# Patient Record
Sex: FEMALE | Race: WHITE | Employment: OTHER | URBAN - METROPOLITAN AREA
[De-identification: names, ages, dates, MRNs, and addresses within clinical notes are randomized per-mention and may not be internally consistent; named-entity substitution may affect disease eponyms.]

---

## 2017-01-01 ENCOUNTER — TELEPHONE (OUTPATIENT)
Dept: FAMILY MEDICINE CLINIC | Facility: CLINIC | Age: 72
End: 2017-01-01

## 2017-01-01 ENCOUNTER — OFFICE VISIT (OUTPATIENT)
Dept: FAMILY MEDICINE CLINIC | Facility: CLINIC | Age: 72
End: 2017-01-01

## 2017-01-01 ENCOUNTER — TELEPHONE (OUTPATIENT)
Dept: NEUROLOGY | Facility: CLINIC | Age: 72
End: 2017-01-01

## 2017-01-01 ENCOUNTER — SURGERY (OUTPATIENT)
Age: 72
End: 2017-01-01

## 2017-01-01 ENCOUNTER — APPOINTMENT (OUTPATIENT)
Dept: PHYSICAL THERAPY | Age: 72
End: 2017-01-01
Attending: FAMILY MEDICINE
Payer: MEDICARE

## 2017-01-01 ENCOUNTER — OFFICE VISIT (OUTPATIENT)
Dept: NEUROLOGY | Facility: CLINIC | Age: 72
End: 2017-01-01

## 2017-01-01 ENCOUNTER — ANESTHESIA EVENT (OUTPATIENT)
Dept: CARDIAC SURGERY | Facility: HOSPITAL | Age: 72
DRG: 252 | End: 2017-01-01
Payer: MEDICARE

## 2017-01-01 ENCOUNTER — HOSPITAL ENCOUNTER (OUTPATIENT)
Dept: GENERAL RADIOLOGY | Age: 72
Discharge: HOME | End: 2017-01-01
Attending: PHYSICAL MEDICINE & REHABILITATION
Payer: MEDICARE

## 2017-01-01 ENCOUNTER — ANESTHESIA (OUTPATIENT)
Dept: CARDIAC SURGERY | Facility: HOSPITAL | Age: 72
DRG: 252 | End: 2017-01-01
Payer: MEDICARE

## 2017-01-01 ENCOUNTER — LAB ENCOUNTER (OUTPATIENT)
Dept: LAB | Age: 72
End: 2017-01-01
Attending: FAMILY MEDICINE
Payer: MEDICARE

## 2017-01-01 ENCOUNTER — PATIENT OUTREACH (OUTPATIENT)
Dept: CASE MANAGEMENT | Age: 72
End: 2017-01-01

## 2017-01-01 ENCOUNTER — HOSPITAL ENCOUNTER (INPATIENT)
Facility: HOSPITAL | Age: 72
LOS: 3 days | Discharge: HOME | DRG: 252 | End: 2017-01-01
Attending: SURGERY | Admitting: SURGERY
Payer: MEDICARE

## 2017-01-01 VITALS
HEIGHT: 62 IN | HEART RATE: 102 BPM | TEMPERATURE: 98 F | RESPIRATION RATE: 17 BRPM | WEIGHT: 276.44 LBS | DIASTOLIC BLOOD PRESSURE: 46 MMHG | OXYGEN SATURATION: 100 % | BODY MASS INDEX: 50.87 KG/M2 | SYSTOLIC BLOOD PRESSURE: 92 MMHG

## 2017-01-01 VITALS
HEART RATE: 101 BPM | TEMPERATURE: 99 F | DIASTOLIC BLOOD PRESSURE: 62 MMHG | WEIGHT: 276.31 LBS | SYSTOLIC BLOOD PRESSURE: 113 MMHG | BODY MASS INDEX: 51 KG/M2

## 2017-01-01 VITALS
HEART RATE: 76 BPM | SYSTOLIC BLOOD PRESSURE: 122 MMHG | DIASTOLIC BLOOD PRESSURE: 50 MMHG | WEIGHT: 265 LBS | BODY MASS INDEX: 48.76 KG/M2 | HEIGHT: 62 IN | RESPIRATION RATE: 16 BRPM

## 2017-01-01 VITALS
SYSTOLIC BLOOD PRESSURE: 85 MMHG | HEART RATE: 89 BPM | DIASTOLIC BLOOD PRESSURE: 54 MMHG | HEIGHT: 62 IN | BODY MASS INDEX: 49.94 KG/M2 | WEIGHT: 271.38 LBS

## 2017-01-01 VITALS
DIASTOLIC BLOOD PRESSURE: 56 MMHG | BODY MASS INDEX: 50.11 KG/M2 | HEART RATE: 79 BPM | WEIGHT: 265.44 LBS | SYSTOLIC BLOOD PRESSURE: 107 MMHG | HEIGHT: 61 IN

## 2017-01-01 DIAGNOSIS — N18.6 ESRD (END STAGE RENAL DISEASE) (HCC): Primary | ICD-10-CM

## 2017-01-01 DIAGNOSIS — M54.16 LUMBAR RADICULOPATHY: ICD-10-CM

## 2017-01-01 DIAGNOSIS — R60.0 BILATERAL EDEMA OF LOWER EXTREMITY: Primary | ICD-10-CM

## 2017-01-01 DIAGNOSIS — Z02.9 ENCOUNTERS FOR ADMINISTRATIVE PURPOSE: ICD-10-CM

## 2017-01-01 DIAGNOSIS — I48.21 PERMANENT ATRIAL FIBRILLATION (HCC): ICD-10-CM

## 2017-01-01 DIAGNOSIS — I89.0 LYMPHEDEMA OF BOTH LOWER EXTREMITIES: Primary | ICD-10-CM

## 2017-01-01 DIAGNOSIS — M1A.9XX0 CHRONIC GOUT WITHOUT TOPHUS, UNSPECIFIED CAUSE, UNSPECIFIED SITE: ICD-10-CM

## 2017-01-01 DIAGNOSIS — E03.9 ACQUIRED HYPOTHYROIDISM: ICD-10-CM

## 2017-01-01 DIAGNOSIS — G62.9 PERIPHERAL POLYNEUROPATHY: ICD-10-CM

## 2017-01-01 DIAGNOSIS — K27.9 PUD (PEPTIC ULCER DISEASE): ICD-10-CM

## 2017-01-01 DIAGNOSIS — M48.061 SPINAL STENOSIS OF LUMBAR REGION WITHOUT NEUROGENIC CLAUDICATION: ICD-10-CM

## 2017-01-01 DIAGNOSIS — I95.9 HYPOTENSION, UNSPECIFIED HYPOTENSION TYPE: ICD-10-CM

## 2017-01-01 DIAGNOSIS — Z78.0 POSTMENOPAUSAL: ICD-10-CM

## 2017-01-01 DIAGNOSIS — I27.20 PULMONARY HYPERTENSION (HCC): ICD-10-CM

## 2017-01-01 DIAGNOSIS — M1A.00X0 IDIOPATHIC CHRONIC GOUT WITHOUT TOPHUS, UNSPECIFIED SITE: ICD-10-CM

## 2017-01-01 DIAGNOSIS — E78.5 HYPERLIPIDEMIA, UNSPECIFIED HYPERLIPIDEMIA TYPE: ICD-10-CM

## 2017-01-01 DIAGNOSIS — Z86.39 HISTORY OF DIABETES MELLITUS, TYPE II: ICD-10-CM

## 2017-01-01 DIAGNOSIS — G57.93 NEUROPATHY INVOLVING BOTH LOWER EXTREMITIES: ICD-10-CM

## 2017-01-01 DIAGNOSIS — E83.51 HYPOCALCEMIA: ICD-10-CM

## 2017-01-01 DIAGNOSIS — M47.816 LUMBAR FACET ARTHROPATHY: ICD-10-CM

## 2017-01-01 DIAGNOSIS — Z00.00 MEDICARE ANNUAL WELLNESS VISIT, SUBSEQUENT: Primary | ICD-10-CM

## 2017-01-01 DIAGNOSIS — E66.01 MORBID OBESITY WITH BMI OF 50.0-59.9, ADULT (HCC): ICD-10-CM

## 2017-01-01 DIAGNOSIS — Z12.39 SCREENING FOR MALIGNANT NEOPLASM OF BREAST: ICD-10-CM

## 2017-01-01 LAB
ALBUMIN SERPL-MCNC: 3.2 G/DL (ref 3.5–4.8)
ALP LIVER SERPL-CCNC: 158 U/L (ref 55–142)
ALT SERPL-CCNC: 22 U/L (ref 14–54)
APTT PPP: 33.7 SECONDS (ref 25–34)
AST SERPL-CCNC: 22 U/L (ref 15–41)
ATRIAL RATE: 468 BPM
BASOPHILS # BLD AUTO: 0.05 X10(3) UL (ref 0–0.1)
BASOPHILS NFR BLD AUTO: 0.7 %
BILIRUB SERPL-MCNC: 0.7 MG/DL (ref 0.1–2)
BUN BLD-MCNC: 20 MG/DL (ref 8–20)
CALCIUM BLD-MCNC: 9.5 MG/DL (ref 8.3–10.3)
CHLORIDE: 97 MMOL/L (ref 101–111)
CHOLEST SMN-MCNC: 107 MG/DL (ref ?–200)
CO2: 28 MMOL/L (ref 22–32)
CREAT BLD-MCNC: 3.84 MG/DL (ref 0.55–1.02)
EOSINOPHIL # BLD AUTO: 0.16 X10(3) UL (ref 0–0.3)
EOSINOPHIL NFR BLD AUTO: 2.3 %
ERYTHROCYTE [DISTWIDTH] IN BLOOD BY AUTOMATED COUNT: 15 % (ref 11.5–16)
EST. AVERAGE GLUCOSE BLD GHB EST-MCNC: 97 MG/DL (ref 68–126)
FREE T4: 1.2 NG/DL (ref 0.9–1.8)
GLUCOSE BLD-MCNC: 101 MG/DL (ref 65–99)
GLUCOSE BLD-MCNC: 104 MG/DL (ref 65–99)
GLUCOSE BLD-MCNC: 108 MG/DL (ref 65–99)
GLUCOSE BLD-MCNC: 109 MG/DL (ref 70–99)
GLUCOSE BLD-MCNC: 112 MG/DL (ref 65–99)
GLUCOSE BLD-MCNC: 113 MG/DL (ref 65–99)
GLUCOSE BLD-MCNC: 118 MG/DL (ref 65–99)
GLUCOSE BLD-MCNC: 121 MG/DL (ref 65–99)
GLUCOSE BLD-MCNC: 129 MG/DL (ref 65–99)
GLUCOSE BLD-MCNC: 86 MG/DL (ref 65–99)
GLUCOSE BLD-MCNC: 91 MG/DL (ref 65–99)
HBA1C MFR BLD HPLC: 5 % (ref ?–5.7)
HCT VFR BLD AUTO: 38.3 % (ref 34–50)
HDLC SERPL-MCNC: 44 MG/DL (ref 45–?)
HDLC SERPL: 2.43 {RATIO} (ref ?–4.44)
HGB BLD-MCNC: 12 G/DL (ref 12–16)
IMMATURE GRANULOCYTE COUNT: 0.02 X10(3) UL (ref 0–1)
IMMATURE GRANULOCYTE RATIO %: 0.3 %
INR BLD: 1.12 (ref 0.89–1.11)
INR BLD: 1.24 (ref 0.89–1.11)
LDLC SERPL CALC-MCNC: 33 MG/DL (ref ?–130)
LDLC SERPL-MCNC: 30 MG/DL (ref 5–40)
LYMPHOCYTES # BLD AUTO: 0.59 X10(3) UL (ref 0.9–4)
LYMPHOCYTES NFR BLD AUTO: 8.4 %
M PROTEIN MFR SERPL ELPH: 9.4 G/DL (ref 6.1–8.3)
MCH RBC QN AUTO: 35.5 PG (ref 27–33.2)
MCHC RBC AUTO-ENTMCNC: 31.3 G/DL (ref 31–37)
MCV RBC AUTO: 113.3 FL (ref 81–100)
MONOCYTES # BLD AUTO: 0.69 X10(3) UL (ref 0.1–0.6)
MONOCYTES NFR BLD AUTO: 9.9 %
NEUTROPHIL ABS PRELIM: 5.49 X10 (3) UL (ref 1.3–6.7)
NEUTROPHILS # BLD AUTO: 5.49 X10(3) UL (ref 1.3–6.7)
NEUTROPHILS NFR BLD AUTO: 78.4 %
NONHDLC SERPL-MCNC: 63 MG/DL (ref ?–130)
PLATELET # BLD AUTO: 122 10(3)UL (ref 150–450)
POTASSIUM SERPL-SCNC: 4 MMOL/L (ref 3.6–5.1)
PSA SERPL DL<=0.01 NG/ML-MCNC: 14.5 SECONDS (ref 12–14.3)
PSA SERPL DL<=0.01 NG/ML-MCNC: 15.7 SECONDS (ref 12–14.3)
Q-T INTERVAL: 396 MS
QRS DURATION: 148 MS
QTC CALCULATION (BEZET): 545 MS
R AXIS: -82 DEGREES
RBC # BLD AUTO: 3.38 X10(6)UL (ref 3.8–5.1)
RED CELL DISTRIBUTION WIDTH-SD: 62.2 FL (ref 35.1–46.3)
SODIUM SERPL-SCNC: 135 MMOL/L (ref 136–144)
T AXIS: 19 DEGREES
TRIGLYCERIDES: 149 MG/DL (ref ?–150)
TSI SER-ACNC: 4.94 MIU/ML (ref 0.35–5.5)
URIC ACID: 2.9 MG/DL (ref 2.4–8)
VENTRICULAR RATE: 114 BPM
WBC # BLD AUTO: 7 X10(3) UL (ref 4–13)

## 2017-01-01 PROCEDURE — 99232 SBSQ HOSP IP/OBS MODERATE 35: CPT | Performed by: INTERNAL MEDICINE

## 2017-01-01 PROCEDURE — 36415 COLL VENOUS BLD VENIPUNCTURE: CPT

## 2017-01-01 PROCEDURE — 84550 ASSAY OF BLOOD/URIC ACID: CPT

## 2017-01-01 PROCEDURE — G0439 PPPS, SUBSEQ VISIT: HCPCS | Performed by: FAMILY MEDICINE

## 2017-01-01 PROCEDURE — 84443 ASSAY THYROID STIM HORMONE: CPT

## 2017-01-01 PROCEDURE — 99204 OFFICE O/P NEW MOD 45 MIN: CPT | Performed by: PHYSICAL MEDICINE & REHABILITATION

## 2017-01-01 PROCEDURE — 83036 HEMOGLOBIN GLYCOSYLATED A1C: CPT

## 2017-01-01 PROCEDURE — 99496 TRANSJ CARE MGMT HIGH F2F 7D: CPT | Performed by: FAMILY MEDICINE

## 2017-01-01 PROCEDURE — 99233 SBSQ HOSP IP/OBS HIGH 50: CPT | Performed by: HOSPITALIST

## 2017-01-01 PROCEDURE — 90935 HEMODIALYSIS ONE EVALUATION: CPT | Performed by: INTERNAL MEDICINE

## 2017-01-01 PROCEDURE — 99232 SBSQ HOSP IP/OBS MODERATE 35: CPT | Performed by: HOSPITALIST

## 2017-01-01 PROCEDURE — 03180ZD BYPASS LEFT BRACHIAL ARTERY TO UPPER ARM VEIN, OPEN APPROACH: ICD-10-PCS | Performed by: SURGERY

## 2017-01-01 PROCEDURE — 80061 LIPID PANEL: CPT

## 2017-01-01 PROCEDURE — 5A1D00Z PERFORMANCE OF URINARY FILTRATION, SINGLE: ICD-10-PCS | Performed by: INTERNAL MEDICINE

## 2017-01-01 PROCEDURE — 99231 SBSQ HOSP IP/OBS SF/LOW 25: CPT | Performed by: HOSPITALIST

## 2017-01-01 PROCEDURE — 72110 X-RAY EXAM L-2 SPINE 4/>VWS: CPT | Performed by: PHYSICAL MEDICINE & REHABILITATION

## 2017-01-01 PROCEDURE — 84439 ASSAY OF FREE THYROXINE: CPT

## 2017-01-01 PROCEDURE — 99222 1ST HOSP IP/OBS MODERATE 55: CPT | Performed by: INTERNAL MEDICINE

## 2017-01-01 PROCEDURE — 99214 OFFICE O/P EST MOD 30 MIN: CPT | Performed by: FAMILY MEDICINE

## 2017-01-01 PROCEDURE — 05SC0ZZ REPOSITION LEFT BASILIC VEIN, OPEN APPROACH: ICD-10-PCS | Performed by: SURGERY

## 2017-01-01 RX ORDER — LEVOTHYROXINE SODIUM 0.12 MG/1
TABLET ORAL
Qty: 90 TABLET | Refills: 0 | Status: SHIPPED | OUTPATIENT
Start: 2017-01-01 | End: 2017-01-01

## 2017-01-01 RX ORDER — LEVOTHYROXINE SODIUM 0.12 MG/1
125 TABLET ORAL
Status: DISCONTINUED | OUTPATIENT
Start: 2017-01-01 | End: 2017-01-01

## 2017-01-01 RX ORDER — ALLOPURINOL 100 MG/1
100 TABLET ORAL 2 TIMES DAILY
Status: DISCONTINUED | OUTPATIENT
Start: 2017-01-01 | End: 2017-01-01

## 2017-01-01 RX ORDER — BUPIVACAINE HYDROCHLORIDE 5 MG/ML
INJECTION, SOLUTION PERINEURAL AS NEEDED
Status: DISCONTINUED | OUTPATIENT
Start: 2017-01-01 | End: 2017-01-01 | Stop reason: HOSPADM

## 2017-01-01 RX ORDER — HEPARIN SODIUM 1000 [USP'U]/ML
1600 INJECTION, SOLUTION INTRAVENOUS; SUBCUTANEOUS ONCE
Status: COMPLETED | OUTPATIENT
Start: 2017-01-01 | End: 2017-01-01

## 2017-01-01 RX ORDER — MORPHINE SULFATE 4 MG/ML
4 INJECTION, SOLUTION INTRAMUSCULAR; INTRAVENOUS
Status: DISCONTINUED | OUTPATIENT
Start: 2017-01-01 | End: 2017-01-01

## 2017-01-01 RX ORDER — HEPARIN SODIUM 1000 [USP'U]/ML
1.5 INJECTION, SOLUTION INTRAVENOUS; SUBCUTANEOUS ONCE
Status: COMPLETED | OUTPATIENT
Start: 2017-01-01 | End: 2017-01-01

## 2017-01-01 RX ORDER — DEXTROSE MONOHYDRATE 25 G/50ML
50 INJECTION, SOLUTION INTRAVENOUS
Status: DISCONTINUED | OUTPATIENT
Start: 2017-01-01 | End: 2017-01-01 | Stop reason: HOSPADM

## 2017-01-01 RX ORDER — ONDANSETRON 2 MG/ML
4 INJECTION INTRAMUSCULAR; INTRAVENOUS AS NEEDED
Status: DISCONTINUED | OUTPATIENT
Start: 2017-01-01 | End: 2017-01-01 | Stop reason: HOSPADM

## 2017-01-01 RX ORDER — DOCUSATE SODIUM 100 MG/1
100 CAPSULE, LIQUID FILLED ORAL 2 TIMES DAILY
Status: DISCONTINUED | OUTPATIENT
Start: 2017-01-01 | End: 2017-01-01

## 2017-01-01 RX ORDER — ONDANSETRON 4 MG/1
4 TABLET, ORALLY DISINTEGRATING ORAL EVERY 6 HOURS PRN
Status: DISCONTINUED | OUTPATIENT
Start: 2017-01-01 | End: 2017-01-01

## 2017-01-01 RX ORDER — CEFAZOLIN SODIUM 1 G/3ML
INJECTION, POWDER, FOR SOLUTION INTRAMUSCULAR; INTRAVENOUS
Status: DISCONTINUED | OUTPATIENT
Start: 2017-01-01 | End: 2017-01-01

## 2017-01-01 RX ORDER — HYDROCODONE BITARTRATE AND ACETAMINOPHEN 5; 325 MG/1; MG/1
1 TABLET ORAL EVERY 4 HOURS PRN
Qty: 60 TABLET | Refills: 0 | Status: SHIPPED | OUTPATIENT
Start: 2017-01-01 | End: 2017-01-01

## 2017-01-01 RX ORDER — SODIUM CHLORIDE 9 MG/ML
INJECTION, SOLUTION INTRAVENOUS CONTINUOUS
Status: DISCONTINUED | OUTPATIENT
Start: 2017-01-01 | End: 2017-01-01

## 2017-01-01 RX ORDER — WARFARIN SODIUM 4 MG/1
6 TABLET ORAL DAILY
Qty: 30 TABLET | Refills: 0 | Status: SHIPPED | OUTPATIENT
Start: 2017-01-01 | End: 2018-01-01

## 2017-01-01 RX ORDER — MIDODRINE HYDROCHLORIDE 10 MG/1
10 TABLET ORAL 2 TIMES DAILY PRN
Status: DISCONTINUED | OUTPATIENT
Start: 2017-01-01 | End: 2017-01-01

## 2017-01-01 RX ORDER — OMEPRAZOLE 20 MG/1
CAPSULE, DELAYED RELEASE ORAL
Qty: 90 CAPSULE | Refills: 0 | Status: SHIPPED | OUTPATIENT
Start: 2017-01-01 | End: 2018-01-01

## 2017-01-01 RX ORDER — HEPARIN SODIUM 1000 [USP'U]/ML
1.5 INJECTION, SOLUTION INTRAVENOUS; SUBCUTANEOUS ONCE
Status: DISCONTINUED | OUTPATIENT
Start: 2017-01-01 | End: 2017-01-01

## 2017-01-01 RX ORDER — PANTOPRAZOLE SODIUM 20 MG/1
20 TABLET, DELAYED RELEASE ORAL
Status: DISCONTINUED | OUTPATIENT
Start: 2017-01-01 | End: 2017-01-01

## 2017-01-01 RX ORDER — MORPHINE SULFATE 4 MG/ML
8 INJECTION, SOLUTION INTRAMUSCULAR; INTRAVENOUS
Status: DISCONTINUED | OUTPATIENT
Start: 2017-01-01 | End: 2017-01-01

## 2017-01-01 RX ORDER — MIDODRINE HYDROCHLORIDE 10 MG/1
10 TABLET ORAL ONCE
Status: COMPLETED | OUTPATIENT
Start: 2017-01-01 | End: 2017-01-01

## 2017-01-01 RX ORDER — LEVOTHYROXINE SODIUM 0.12 MG/1
TABLET ORAL
Qty: 90 TABLET | Refills: 0 | Status: SHIPPED | OUTPATIENT
Start: 2017-01-01 | End: 2018-01-01

## 2017-01-01 RX ORDER — HYDROCODONE BITARTRATE AND ACETAMINOPHEN 5; 325 MG/1; MG/1
2 TABLET ORAL AS NEEDED
Status: DISCONTINUED | OUTPATIENT
Start: 2017-01-01 | End: 2017-01-01 | Stop reason: HOSPADM

## 2017-01-01 RX ORDER — PSEUDOEPHEDRINE HCL 30 MG
100 TABLET ORAL 2 TIMES DAILY
Qty: 30 CAPSULE | Refills: 0 | Status: SHIPPED | OUTPATIENT
Start: 2017-01-01 | End: 2017-01-01

## 2017-01-01 RX ORDER — HYDROCODONE BITARTRATE AND ACETAMINOPHEN 5; 325 MG/1; MG/1
2 TABLET ORAL EVERY 4 HOURS PRN
Status: DISCONTINUED | OUTPATIENT
Start: 2017-01-01 | End: 2017-01-01

## 2017-01-01 RX ORDER — DILTIAZEM HYDROCHLORIDE 5 MG/ML
5 INJECTION INTRAVENOUS ONCE
Status: COMPLETED | OUTPATIENT
Start: 2017-01-01 | End: 2017-01-01

## 2017-01-01 RX ORDER — HYDROCODONE BITARTRATE AND ACETAMINOPHEN 5; 325 MG/1; MG/1
1 TABLET ORAL AS NEEDED
Status: DISCONTINUED | OUTPATIENT
Start: 2017-01-01 | End: 2017-01-01 | Stop reason: HOSPADM

## 2017-01-01 RX ORDER — SODIUM CHLORIDE, SODIUM LACTATE, POTASSIUM CHLORIDE, CALCIUM CHLORIDE 600; 310; 30; 20 MG/100ML; MG/100ML; MG/100ML; MG/100ML
INJECTION, SOLUTION INTRAVENOUS CONTINUOUS
Status: DISCONTINUED | OUTPATIENT
Start: 2017-01-01 | End: 2017-01-01

## 2017-01-01 RX ORDER — DILTIAZEM HYDROCHLORIDE 5 MG/ML
INJECTION INTRAVENOUS
Status: COMPLETED
Start: 2017-01-01 | End: 2017-01-01

## 2017-01-01 RX ORDER — GABAPENTIN 300 MG/1
CAPSULE ORAL
Qty: 60 CAPSULE | Refills: 1 | Status: SHIPPED | OUTPATIENT
Start: 2017-01-01 | End: 2018-01-01

## 2017-01-01 RX ORDER — HYDROCODONE BITARTRATE AND ACETAMINOPHEN 5; 325 MG/1; MG/1
1 TABLET ORAL EVERY 4 HOURS PRN
Status: DISCONTINUED | OUTPATIENT
Start: 2017-01-01 | End: 2017-01-01

## 2017-01-01 RX ORDER — OMEPRAZOLE 20 MG/1
CAPSULE, DELAYED RELEASE ORAL
Qty: 90 CAPSULE | Refills: 0 | Status: SHIPPED | OUTPATIENT
Start: 2017-01-01 | End: 2017-01-01

## 2017-01-01 RX ORDER — ALLOPURINOL 100 MG/1
TABLET ORAL
Qty: 180 TABLET | Refills: 0 | Status: SHIPPED | OUTPATIENT
Start: 2017-01-01 | End: 2018-01-01

## 2017-01-01 RX ORDER — ALBUMIN (HUMAN) 12.5 G/50ML
25 SOLUTION INTRAVENOUS
Status: DISCONTINUED | OUTPATIENT
Start: 2017-01-01 | End: 2017-01-01

## 2017-01-01 RX ORDER — NALOXONE HYDROCHLORIDE 0.4 MG/ML
80 INJECTION, SOLUTION INTRAMUSCULAR; INTRAVENOUS; SUBCUTANEOUS AS NEEDED
Status: DISCONTINUED | OUTPATIENT
Start: 2017-01-01 | End: 2017-01-01 | Stop reason: HOSPADM

## 2017-01-01 RX ORDER — MAGNESIUM OXIDE TAB 400 MG (241.3 MG ELEMENTAL MG) 400 (241.3 MG) MG
1 TAB ORAL NIGHTLY
Refills: 3 | COMMUNITY
Start: 2017-06-29

## 2017-01-01 RX ORDER — MIDODRINE HYDROCHLORIDE 10 MG/1
10 TABLET ORAL 2 TIMES DAILY PRN
Qty: 30 TABLET | Refills: 0 | Status: ON HOLD | OUTPATIENT
Start: 2017-01-01 | End: 2018-01-01

## 2017-01-01 RX ORDER — ONDANSETRON 2 MG/ML
4 INJECTION INTRAMUSCULAR; INTRAVENOUS EVERY 6 HOURS PRN
Status: DISCONTINUED | OUTPATIENT
Start: 2017-01-01 | End: 2017-01-01

## 2017-01-01 RX ORDER — SEVELAMER HYDROCHLORIDE 400 MG/1
800 TABLET, FILM COATED ORAL
Status: DISCONTINUED | OUTPATIENT
Start: 2017-01-01 | End: 2017-01-01

## 2017-01-01 RX ORDER — WARFARIN SODIUM 5 MG/1
5 TABLET ORAL NIGHTLY
Status: DISCONTINUED | OUTPATIENT
Start: 2017-01-01 | End: 2017-01-01

## 2017-01-01 RX ORDER — MORPHINE SULFATE 2 MG/ML
2 INJECTION, SOLUTION INTRAMUSCULAR; INTRAVENOUS
Status: DISCONTINUED | OUTPATIENT
Start: 2017-01-01 | End: 2017-01-01

## 2017-01-01 RX ORDER — HYDROMORPHONE HYDROCHLORIDE 1 MG/ML
0.4 INJECTION, SOLUTION INTRAMUSCULAR; INTRAVENOUS; SUBCUTANEOUS EVERY 5 MIN PRN
Status: DISCONTINUED | OUTPATIENT
Start: 2017-01-01 | End: 2017-01-01 | Stop reason: HOSPADM

## 2017-01-01 RX ORDER — DILTIAZEM HYDROCHLORIDE 5 MG/ML
10 INJECTION INTRAVENOUS EVERY 2 HOUR PRN
Status: DISCONTINUED | OUTPATIENT
Start: 2017-01-01 | End: 2017-01-01

## 2017-01-01 RX ORDER — HYDROMORPHONE HYDROCHLORIDE 1 MG/ML
INJECTION, SOLUTION INTRAMUSCULAR; INTRAVENOUS; SUBCUTANEOUS
Status: COMPLETED
Start: 2017-01-01 | End: 2017-01-01

## 2017-01-11 ENCOUNTER — LAB ENCOUNTER (OUTPATIENT)
Dept: LAB | Age: 72
End: 2017-01-11
Attending: INTERNAL MEDICINE
Payer: MEDICARE

## 2017-01-11 DIAGNOSIS — I48.91 ATRIAL FIBRILLATION (HCC): Primary | ICD-10-CM

## 2017-01-11 LAB
INR BLD: 1.78 (ref 0.89–1.12)
PSA SERPL DL<=0.01 NG/ML-MCNC: 21.3 SECONDS (ref 12.3–14.8)

## 2017-01-11 PROCEDURE — 36415 COLL VENOUS BLD VENIPUNCTURE: CPT | Performed by: INTERNAL MEDICINE

## 2017-01-11 PROCEDURE — 85610 PROTHROMBIN TIME: CPT | Performed by: INTERNAL MEDICINE

## 2017-01-16 ENCOUNTER — OFFICE VISIT (OUTPATIENT)
Dept: FAMILY MEDICINE CLINIC | Facility: CLINIC | Age: 72
End: 2017-01-16

## 2017-01-16 VITALS
TEMPERATURE: 97 F | BODY MASS INDEX: 53.92 KG/M2 | WEIGHT: 293 LBS | DIASTOLIC BLOOD PRESSURE: 68 MMHG | SYSTOLIC BLOOD PRESSURE: 114 MMHG | HEIGHT: 62 IN | HEART RATE: 100 BPM

## 2017-01-16 DIAGNOSIS — E66.01 MORBID OBESITY WITH BMI OF 50.0-59.9, ADULT (HCC): ICD-10-CM

## 2017-01-16 DIAGNOSIS — R53.1 GENERALIZED WEAKNESS: ICD-10-CM

## 2017-01-16 DIAGNOSIS — R26.81 UNSTEADY GAIT: ICD-10-CM

## 2017-01-16 DIAGNOSIS — Z91.81 AT RISK FOR FALLING: ICD-10-CM

## 2017-01-16 DIAGNOSIS — R60.0 BILATERAL EDEMA OF LOWER EXTREMITY: Primary | ICD-10-CM

## 2017-01-16 PROCEDURE — 99214 OFFICE O/P EST MOD 30 MIN: CPT | Performed by: FAMILY MEDICINE

## 2017-01-16 PROCEDURE — G0009 ADMIN PNEUMOCOCCAL VACCINE: HCPCS | Performed by: FAMILY MEDICINE

## 2017-01-16 PROCEDURE — 90670 PCV13 VACCINE IM: CPT | Performed by: FAMILY MEDICINE

## 2017-01-17 NOTE — PROGRESS NOTES
Pete Barnhart is a 80year old female. HPI:     Pt has chronic lymph edema of b/l lower extremities. She has not yet made appointment to be seen at the lymph edema clinic.  Pt states it has been hard to get in all her doctor appointments as well as go Take 1 tablet by mouth as needed with dialysis (sbp<90). (Patient taking differently: Take 10 mg by mouth.  Take 1 tab by mouth 1 hr before dialysis and 1 tab during dialysis as needed Monday to Friday. ) Disp: 30 tablet Rfl: 0   acetaminophen (TYLENOL) 325 6/25/2016   • Low HDL (under 40) 6/25/2016   • Morbid obesity with BMI of 50.0-59.9, adult ([facility] 1/16/2017   • Bilateral edema of lower extremity 1/16/2017   • Unsteady gait 1/16/2017   • Generalized weakness 1/16/2017   • At risk for falling 7/26/2015 Psychiatric: She has a normal mood and affect.  Her behavior is normal.       ASSESSMENT AND PLAN:     Bilateral edema of lower extremity  (primary encounter diagnosis)  Unsteady gait  Morbid obesity with bmi of 50.0-59.9, adult (hcc)  Generalized weaknes

## 2017-01-23 ENCOUNTER — TELEPHONE (OUTPATIENT)
Dept: FAMILY MEDICINE CLINIC | Facility: CLINIC | Age: 72
End: 2017-01-23

## 2017-01-23 DIAGNOSIS — E78.2 MIXED HYPERLIPIDEMIA: Primary | ICD-10-CM

## 2017-01-24 NOTE — TELEPHONE ENCOUNTER
Left message with patient's  that we were returning her phone call    Patient's question has been answered

## 2017-01-25 ENCOUNTER — LAB ENCOUNTER (OUTPATIENT)
Dept: LAB | Facility: HOSPITAL | Age: 72
End: 2017-01-25
Attending: INTERNAL MEDICINE
Payer: MEDICARE

## 2017-01-25 ENCOUNTER — PRIOR ORIGINAL RECORDS (OUTPATIENT)
Dept: OTHER | Age: 72
End: 2017-01-25

## 2017-01-25 ENCOUNTER — TELEPHONE (OUTPATIENT)
Dept: FAMILY MEDICINE CLINIC | Facility: CLINIC | Age: 72
End: 2017-01-25

## 2017-01-25 DIAGNOSIS — I48.91 ATRIAL FIBRILLATION (HCC): Primary | ICD-10-CM

## 2017-01-25 LAB
INR BLD: 2.51 (ref 0.89–1.12)
PSA SERPL DL<=0.01 NG/ML-MCNC: 28 SECONDS (ref 12.3–14.8)

## 2017-01-25 PROCEDURE — 36415 COLL VENOUS BLD VENIPUNCTURE: CPT

## 2017-01-25 PROCEDURE — 85610 PROTHROMBIN TIME: CPT

## 2017-01-25 NOTE — TELEPHONE ENCOUNTER
Flo Sanford from [employer] is calling to see if Chelsea Silvio had a order for wheelchair?   Please call Flo Sanford at [phone]

## 2017-01-26 NOTE — TELEPHONE ENCOUNTER
Spoke to Bhupendra Arrington and he was asking if an order for a wheelchair was sent. Confirmed that orders were sent on 1/16/17 to [address] [place] [address].   He states he will contact them for further questions and get back to our office if any further questions

## 2017-01-27 ENCOUNTER — TELEPHONE (OUTPATIENT)
Dept: FAMILY MEDICINE CLINIC | Facility: CLINIC | Age: 72
End: 2017-01-27

## 2017-01-30 NOTE — TELEPHONE ENCOUNTER
Spoke to Jorgito and he states that the insurance never got a copy in order to approve this wheelchair. Another copy will be faxed to [facility] at [phone]. Also copy faxed to Siddhartha/FRANNY at [phone] for his records and reference.   Walker Amos

## 2017-03-02 RX ORDER — ALLOPURINOL 100 MG/1
TABLET ORAL
Qty: 60 TABLET | Refills: 0 | Status: SHIPPED | OUTPATIENT
Start: 2017-03-02 | End: 2017-03-17

## 2017-03-03 ENCOUNTER — LAB ENCOUNTER (OUTPATIENT)
Dept: LAB | Age: 72
End: 2017-03-03
Attending: FAMILY MEDICINE
Payer: MEDICARE

## 2017-03-03 DIAGNOSIS — I48.91 ATRIAL FIBRILLATION (HCC): Primary | ICD-10-CM

## 2017-03-03 DIAGNOSIS — R79.89 ELEVATED FERRITIN: ICD-10-CM

## 2017-03-03 DIAGNOSIS — E03.9 ACQUIRED HYPOTHYROIDISM: ICD-10-CM

## 2017-03-03 DIAGNOSIS — E78.2 MIXED HYPERLIPIDEMIA: ICD-10-CM

## 2017-03-03 LAB
CHOLEST SMN-MCNC: 148 MG/DL (ref ?–200)
DEPRECATED HBV CORE AB SER IA-ACNC: 691.9 NG/ML (ref 10–291)
FREE T4: 1.3 NG/DL (ref 0.9–1.8)
HDLC SERPL-MCNC: 52 MG/DL (ref 45–?)
HDLC SERPL: 2.85 {RATIO} (ref ?–4.44)
LDLC SERPL CALC-MCNC: 53 MG/DL (ref ?–130)
NONHDLC SERPL-MCNC: 96 MG/DL (ref ?–130)
TRIGLYCERIDES: 215 MG/DL (ref ?–150)
TSI SER-ACNC: 3.45 MIU/ML (ref 0.35–5.5)
VLDL: 43 MG/DL (ref 5–40)

## 2017-03-03 PROCEDURE — 84439 ASSAY OF FREE THYROXINE: CPT

## 2017-03-03 PROCEDURE — 82728 ASSAY OF FERRITIN: CPT

## 2017-03-03 PROCEDURE — 84443 ASSAY THYROID STIM HORMONE: CPT

## 2017-03-03 PROCEDURE — 36415 COLL VENOUS BLD VENIPUNCTURE: CPT

## 2017-03-03 PROCEDURE — 85610 PROTHROMBIN TIME: CPT

## 2017-03-03 PROCEDURE — 80061 LIPID PANEL: CPT

## 2017-03-07 ENCOUNTER — TELEPHONE (OUTPATIENT)
Dept: FAMILY MEDICINE CLINIC | Facility: CLINIC | Age: 72
End: 2017-03-07

## 2017-03-07 NOTE — TELEPHONE ENCOUNTER
----- Message from Kat Leo DO sent at 3/7/2017  1:09 AM CST -----  Please inform patient test results are stable, we will discuss with patient further at her upcoming appointment.

## 2017-03-10 ENCOUNTER — LAB ENCOUNTER (OUTPATIENT)
Dept: LAB | Age: 72
End: 2017-03-10
Attending: INTERNAL MEDICINE
Payer: MEDICARE

## 2017-03-10 DIAGNOSIS — I48.91 ATRIAL FIBRILLATION (HCC): Primary | ICD-10-CM

## 2017-03-10 LAB
INR BLD: 2.33 (ref 0.89–1.11)
PSA SERPL DL<=0.01 NG/ML-MCNC: 26 SECONDS (ref 12–14.3)

## 2017-03-10 PROCEDURE — 85610 PROTHROMBIN TIME: CPT

## 2017-03-10 PROCEDURE — 36415 COLL VENOUS BLD VENIPUNCTURE: CPT

## 2017-03-17 ENCOUNTER — OFFICE VISIT (OUTPATIENT)
Dept: FAMILY MEDICINE CLINIC | Facility: CLINIC | Age: 72
End: 2017-03-17

## 2017-03-17 VITALS — RESPIRATION RATE: 18 BRPM | SYSTOLIC BLOOD PRESSURE: 107 MMHG | DIASTOLIC BLOOD PRESSURE: 57 MMHG | HEART RATE: 104 BPM

## 2017-03-17 DIAGNOSIS — E78.2 MIXED HYPERLIPIDEMIA: ICD-10-CM

## 2017-03-17 DIAGNOSIS — M47.816 FACET ARTHRITIS OF LUMBAR REGION: ICD-10-CM

## 2017-03-17 DIAGNOSIS — N18.6 ESRD ON DIALYSIS (HCC): ICD-10-CM

## 2017-03-17 DIAGNOSIS — E78.6 LOW HDL (UNDER 40): ICD-10-CM

## 2017-03-17 DIAGNOSIS — G25.2 INTENTION TREMOR: ICD-10-CM

## 2017-03-17 DIAGNOSIS — I48.20 CHRONIC ATRIAL FIBRILLATION (HCC): ICD-10-CM

## 2017-03-17 DIAGNOSIS — K27.9 PUD (PEPTIC ULCER DISEASE): ICD-10-CM

## 2017-03-17 DIAGNOSIS — R29.898 WEAKNESS OF BOTH LOWER EXTREMITIES: ICD-10-CM

## 2017-03-17 DIAGNOSIS — Z99.2 ESRD ON DIALYSIS (HCC): ICD-10-CM

## 2017-03-17 DIAGNOSIS — M1A.00X0 IDIOPATHIC CHRONIC GOUT WITHOUT TOPHUS, UNSPECIFIED SITE: ICD-10-CM

## 2017-03-17 DIAGNOSIS — E88.09 HYPOALBUMINEMIA DUE TO PROTEIN-CALORIE MALNUTRITION (HCC): ICD-10-CM

## 2017-03-17 DIAGNOSIS — E03.9 ACQUIRED HYPOTHYROIDISM: Primary | ICD-10-CM

## 2017-03-17 DIAGNOSIS — R20.2 PARESTHESIA OF BOTH FEET: ICD-10-CM

## 2017-03-17 DIAGNOSIS — Z86.39 HISTORY OF DIABETES MELLITUS, TYPE II: ICD-10-CM

## 2017-03-17 DIAGNOSIS — E46 HYPOALBUMINEMIA DUE TO PROTEIN-CALORIE MALNUTRITION (HCC): ICD-10-CM

## 2017-03-17 LAB
EST. AVERAGE GLUCOSE BLD GHB EST-MCNC: 94 MG/DL (ref 68–126)
HAV AB SERPL IA-ACNC: 368 PG/ML (ref 193–986)
HBA1C MFR BLD HPLC: 4.9 % (ref ?–5.7)

## 2017-03-17 PROCEDURE — 82607 VITAMIN B-12: CPT | Performed by: FAMILY MEDICINE

## 2017-03-17 PROCEDURE — 83036 HEMOGLOBIN GLYCOSYLATED A1C: CPT | Performed by: FAMILY MEDICINE

## 2017-03-17 PROCEDURE — 99214 OFFICE O/P EST MOD 30 MIN: CPT | Performed by: FAMILY MEDICINE

## 2017-03-17 RX ORDER — ALLOPURINOL 100 MG/1
TABLET ORAL
Qty: 180 TABLET | Refills: 1 | Status: SHIPPED | OUTPATIENT
Start: 2017-03-17 | End: 2017-01-01

## 2017-03-17 RX ORDER — LEVOTHYROXINE SODIUM 0.12 MG/1
125 TABLET ORAL
Qty: 90 TABLET | Refills: 1 | Status: SHIPPED | OUTPATIENT
Start: 2017-03-17 | End: 2017-01-01

## 2017-03-17 RX ORDER — OMEPRAZOLE 20 MG/1
CAPSULE, DELAYED RELEASE ORAL
Qty: 90 CAPSULE | Refills: 1 | Status: SHIPPED | OUTPATIENT
Start: 2017-03-17 | End: 2017-01-01

## 2017-03-17 NOTE — PROGRESS NOTES
Alma Rosa Cruz is a 71year old female. HPI:       Tremor for \"a couple of years\". Getting worse again. Trouble with using a fork and writing. Brother had Parkinson's. Tingling sharp pain on and off of feet or toes for a few months.   Not every Rfl: 1   levetiracetam 250 MG Oral Tab Take 1 tablet (250 mg total) by mouth 2 (two) times daily. Disp: 60 tablet Rfl: 2   ammonium lactate (LAC-HYDRIN) 12 % External Lotion Apply to arms daily.  Disp: 500 g Rfl: 2   RENVELA 800 MG Oral Tab TK 2 TS PO TID W • ISABELLE (obstructive sleep apnea)    • Seizure disorder ([facility])    • Hypothyroidism    • Frequent UTI    • Depression    • CKD (chronic kidney disease) stage 4, GFR 15-29 ml/min ([facility])    • Sleep apnea    • Atrial fibrillation with slow ventricular response ( RESPIRATORY: Denies: GORDY/DEL VALLE/Cough/Wheeze/Orthopnea/PND  CARDIOVASCULAR: Denies CP/palpitations/DEL VALLE  VASCULAR: Denies claudication type symptoms  GI: Denies abdominal pain/nausea/vomiting/blood in stool/black stool/bloating/constipation/diarrhea  : no diagnosis)  Chronic atrial fibrillation (hcc)  Hypoalbuminemia due to protein-calorie malnutrition (hcc)  Facet arthritis of lumbar region (hcc)  Weakness of both lower extremities  Esrd on dialysis (hcc)  History of diabetes mellitus, type ii  Mixed hyper TWICE DAILY  Dispense: 180 tablet; Refill: 1    13. PUD (peptic ulcer disease)  Continue omeprazole 20 mg daily. - omeprazole 20 MG Oral Capsule Delayed Release; TAKE 1 CAPSULE(20 MG) BY MOUTH EVERY MORNING  Dispense: 90 capsule;  Refill: 1            Orde

## 2017-03-22 ENCOUNTER — TELEPHONE (OUTPATIENT)
Dept: FAMILY MEDICINE CLINIC | Facility: CLINIC | Age: 72
End: 2017-03-22

## 2017-03-22 NOTE — TELEPHONE ENCOUNTER
Spoke to patient with results/instructions. Pt verbalized understanding. Doc,    She does want to know when she should do the next A1C-6 months?   thanks

## 2017-03-22 NOTE — TELEPHONE ENCOUNTER
----- Message from Juan Manuel Montalvo DO sent at 3/20/2017 10:11 PM CDT -----  Please call patient, B12 is low normal range, recommend patient take over-the-counter B12 500 µg once or twice a day.   A1c is in normal range, she does not need to check her blood

## 2017-03-23 DIAGNOSIS — Z87.898 HISTORY OF SEIZURE: Primary | ICD-10-CM

## 2017-03-23 RX ORDER — LEVETIRACETAM 250 MG/1
250 TABLET ORAL 2 TIMES DAILY
Qty: 60 TABLET | Refills: 0 | Status: SHIPPED | OUTPATIENT
Start: 2017-03-23 | End: 2017-04-13

## 2017-03-23 NOTE — TELEPHONE ENCOUNTER
Medication: Levetiracetam    Date of last refill: 11-30-16  [place]     Last office visit: 11-30-16  Due back to clinic per last office note:  2 mo  Date next office visit scheduled:  3-29-17    Last OV note recommendati

## 2017-03-27 ENCOUNTER — PRIOR ORIGINAL RECORDS (OUTPATIENT)
Dept: OTHER | Age: 72
End: 2017-03-27

## 2017-03-29 ENCOUNTER — OFFICE VISIT (OUTPATIENT)
Dept: NEUROLOGY | Facility: CLINIC | Age: 72
End: 2017-03-29

## 2017-03-29 VITALS — SYSTOLIC BLOOD PRESSURE: 118 MMHG | DIASTOLIC BLOOD PRESSURE: 68 MMHG | HEART RATE: 82 BPM

## 2017-03-29 DIAGNOSIS — Z87.898 HISTORY OF SEIZURE: Primary | ICD-10-CM

## 2017-03-29 DIAGNOSIS — G25.0 ESSENTIAL TREMOR: ICD-10-CM

## 2017-03-29 PROCEDURE — 99214 OFFICE O/P EST MOD 30 MIN: CPT | Performed by: OTHER

## 2017-03-29 RX ORDER — PYRIDOXINE HCL (VITAMIN B6) 50 MG
500 TABLET ORAL 2 TIMES DAILY
COMMUNITY

## 2017-03-29 NOTE — PROGRESS NOTES
[employer] Progress Note    HPI  Patient presents with:  Seizures  Tremors: has happened in the past, recently has become worse.        As per my initial H&P from 8/10/16:  \"Susanne Higgins is a 80year old, who presents for evaluatio failure) ([facility]    • PVD (peripheral vascular disease) ([facility]    • Anemia    • SIABELLE (obstructive sleep apnea)    • Seizure disorder (HCC)    • Hypothyroidism    • Frequent UTI    • Depression    • CKD (chronic kidney disease) stage 4, GFR 15-29 ml/min ([facility]) Smokeless Status: Never Used                        Alcohol Use: No              Drug Use: No            Other Topics            Concern  Caffeine Concern        No    Comment:coffee occ  Exercise                No          Celebrex [Hailey by mouth daily. , Disp: , Rfl:   •  acetaminophen (TYLENOL) 325 MG Oral Tab, Take 2 tablets by mouth every 6 (six) hours as needed. , Disp: 60 tablet, Rfl: 0  •  Skin Protectants, Misc. (EUCERIN) Apply Externally Cream, Apply 1 Application topically Q shift. old woman with complicated past medical history, including chronic lymphedema, who originally presented 8/2016 for evaluation and management of history of seizures.      Patient has previously been stable clinically on Keppra 500 mg twice a day for many yea

## 2017-03-29 NOTE — PATIENT INSTRUCTIONS
Stop taking Keppra 250 mg bid  Follow up in 3 months    After your visit at the [place] office  today,  please direct any follow up questions or medication needs to the staff in our  [place] office so that your concerns may be promptly addressed.   [employer] insurer. Depending on your insurance carrier, approval may take 3-10 days. It is highly recommended patients contact their insurance carrier directly to determine coverage.   If test is done without insurance authorization, patient may be responsible for t

## 2017-03-29 NOTE — PROGRESS NOTES
Patient here to follow up regarding seizures. States that she has not had any episodes since last visit. Does note that tremors in her hands can be severe at times. Here to discuss the next steps.

## 2017-03-30 RX ORDER — ALLOPURINOL 100 MG/1
TABLET ORAL
Qty: 60 TABLET | Refills: 0 | OUTPATIENT
Start: 2017-03-30

## 2017-03-30 NOTE — TELEPHONE ENCOUNTER
Allopurinol not approved. An rx was sent 3/17/17 for qty 180 + 1 refill.   No refill needed at this time

## 2017-04-13 ENCOUNTER — APPOINTMENT (OUTPATIENT)
Dept: GENERAL RADIOLOGY | Facility: HOSPITAL | Age: 72
End: 2017-04-13
Attending: EMERGENCY MEDICINE
Payer: MEDICARE

## 2017-04-13 ENCOUNTER — HOSPITAL ENCOUNTER (EMERGENCY)
Facility: HOSPITAL | Age: 72
Discharge: LEFT BEFORE DISCHARGE | End: 2017-04-14
Attending: EMERGENCY MEDICINE
Payer: MEDICARE

## 2017-04-13 DIAGNOSIS — I48.91 ATRIAL FIBRILLATION WITH RAPID VENTRICULAR RESPONSE (HCC): Primary | ICD-10-CM

## 2017-04-13 DIAGNOSIS — R07.9 ACUTE CHEST PAIN: ICD-10-CM

## 2017-04-13 PROCEDURE — 85025 COMPLETE CBC W/AUTO DIFF WBC: CPT | Performed by: EMERGENCY MEDICINE

## 2017-04-13 PROCEDURE — 99285 EMERGENCY DEPT VISIT HI MDM: CPT

## 2017-04-13 PROCEDURE — 93005 ELECTROCARDIOGRAM TRACING: CPT

## 2017-04-13 PROCEDURE — 85610 PROTHROMBIN TIME: CPT | Performed by: EMERGENCY MEDICINE

## 2017-04-13 PROCEDURE — 85730 THROMBOPLASTIN TIME PARTIAL: CPT | Performed by: EMERGENCY MEDICINE

## 2017-04-13 PROCEDURE — 93010 ELECTROCARDIOGRAM REPORT: CPT

## 2017-04-13 PROCEDURE — 80053 COMPREHEN METABOLIC PANEL: CPT | Performed by: EMERGENCY MEDICINE

## 2017-04-13 PROCEDURE — 96374 THER/PROPH/DIAG INJ IV PUSH: CPT

## 2017-04-13 PROCEDURE — 71010 XR CHEST AP PORTABLE  (CPT=71010): CPT

## 2017-04-13 PROCEDURE — 84484 ASSAY OF TROPONIN QUANT: CPT | Performed by: EMERGENCY MEDICINE

## 2017-04-13 RX ORDER — DILTIAZEM HYDROCHLORIDE 5 MG/ML
10 INJECTION INTRAVENOUS ONCE
Status: COMPLETED | OUTPATIENT
Start: 2017-04-13 | End: 2017-04-13

## 2017-04-14 ENCOUNTER — PRIOR ORIGINAL RECORDS (OUTPATIENT)
Dept: OTHER | Age: 72
End: 2017-04-14

## 2017-04-14 ENCOUNTER — TELEPHONE (OUTPATIENT)
Dept: FAMILY MEDICINE CLINIC | Facility: CLINIC | Age: 72
End: 2017-04-14

## 2017-04-14 VITALS
TEMPERATURE: 99 F | WEIGHT: 263.69 LBS | RESPIRATION RATE: 24 BRPM | DIASTOLIC BLOOD PRESSURE: 55 MMHG | HEART RATE: 106 BPM | OXYGEN SATURATION: 96 % | HEIGHT: 62 IN | SYSTOLIC BLOOD PRESSURE: 117 MMHG | BODY MASS INDEX: 48.53 KG/M2

## 2017-04-14 RX ORDER — AZITHROMYCIN 250 MG/1
TABLET, FILM COATED ORAL
Qty: 1 PACKAGE | Refills: 0 | Status: SHIPPED | OUTPATIENT
Start: 2017-04-14 | End: 2017-04-19 | Stop reason: ALTCHOICE

## 2017-04-14 NOTE — ED PROVIDER NOTES
Patient Seen in: [facility] Emergency Department    History   Patient presents with:  Chest Pain Angina (cardiovascular)    Stated Complaint: cp    HPI    18-year-old female presents with midsternal chest pain for 3 days.   Patient has not noted any ([facility] 6/25/2016   • Morbid obesity due to excess calories ([facility] 6/25/2016   • Mixed hyperlipidemia 6/25/2016   • Low HDL (under 40) 6/25/2016   • Morbid obesity with BMI of 50.0-59.9, adult ([facility] 1/16/2017   • Bilateral edema of lower extremity 1/16/2017 by mouth as needed with dialysis (sbp<90). Patient taking differently: Take 10 mg by mouth. Take 1 tab by mouth 1 hr before dialysis and 1 tab during dialysis as needed Monday to Friday.     NEPHRO-ANTWON (NEPHRO-ANTWON) 0.8 MG Oral Tab,  Take 0.8 mg by mouth Extremities: 2+ lower extremity edema bilaterally, normal peripheral pulses   Neuro: Alert oriented and nonfocal   Skin: no rashes or nodules    ED Course     Labs Reviewed   COMP METABOLIC PANEL (14) - Abnormal; Notable for the following:     BUN 26 (*) fibrillation with rapid ventricular response. No evidence of acute ischemia. Right bundle branch block previously seen on EKG November 9, 2015.           Xr Chest Ap Portable  (cpt=71010)    4/13/2017  PROCEDURE:  XR CHEST AP PORTABLE (CPT=71010)  TECHNIQ prescription for antibiotics for possible bronchitis.         Disposition and Plan     Clinical Impression:  Atrial fibrillation with rapid ventricular response ([address]  (primary encounter diagnosis)  Acute chest pain    Disposition:  [place]    Follow-up:  Flaquita Charles

## 2017-04-14 NOTE — ED NOTES
Assisted to dress and to wheelchair with assist of 2 nurses for safety.     Pt brought in to ER in her own wheelchair

## 2017-04-19 ENCOUNTER — OFFICE VISIT (OUTPATIENT)
Dept: FAMILY MEDICINE CLINIC | Facility: CLINIC | Age: 72
End: 2017-04-19

## 2017-04-19 ENCOUNTER — APPOINTMENT (OUTPATIENT)
Dept: LAB | Age: 72
End: 2017-04-19
Attending: FAMILY MEDICINE
Payer: MEDICARE

## 2017-04-19 DIAGNOSIS — R09.02 HYPOXIA: ICD-10-CM

## 2017-04-19 DIAGNOSIS — J18.9 CAP (COMMUNITY ACQUIRED PNEUMONIA): ICD-10-CM

## 2017-04-19 DIAGNOSIS — R06.02 SHORTNESS OF BREATH: ICD-10-CM

## 2017-04-19 DIAGNOSIS — I48.20 CHRONIC ATRIAL FIBRILLATION (HCC): ICD-10-CM

## 2017-04-19 DIAGNOSIS — Z79.01 ANTICOAGULATED ON WARFARIN: ICD-10-CM

## 2017-04-19 DIAGNOSIS — J18.9 PNEUMONIA OF RIGHT LOWER LOBE DUE TO INFECTIOUS ORGANISM: Primary | ICD-10-CM

## 2017-04-19 DIAGNOSIS — R77.9 ELEVATED SERUM PROTEIN LEVEL: ICD-10-CM

## 2017-04-19 PROCEDURE — 84165 PROTEIN E-PHORESIS SERUM: CPT

## 2017-04-19 PROCEDURE — 83883 ASSAY NEPHELOMETRY NOT SPEC: CPT

## 2017-04-19 PROCEDURE — 83880 ASSAY OF NATRIURETIC PEPTIDE: CPT

## 2017-04-19 PROCEDURE — 86334 IMMUNOFIX E-PHORESIS SERUM: CPT

## 2017-04-19 PROCEDURE — 99214 OFFICE O/P EST MOD 30 MIN: CPT | Performed by: FAMILY MEDICINE

## 2017-04-19 PROCEDURE — 36415 COLL VENOUS BLD VENIPUNCTURE: CPT

## 2017-04-19 RX ORDER — PANTOPRAZOLE SODIUM 40 MG/1
TABLET, DELAYED RELEASE ORAL
COMMUNITY
Start: 2017-04-13 | End: 2017-06-14 | Stop reason: ALTCHOICE

## 2017-04-19 RX ORDER — CEFUROXIME AXETIL 500 MG/1
500 TABLET ORAL EVERY 12 HOURS
Qty: 20 TABLET | Refills: 0 | Status: SHIPPED | OUTPATIENT
Start: 2017-04-19 | End: 2017-04-29

## 2017-04-19 NOTE — PATIENT INSTRUCTIONS
If your shortness of breath worsens or difficulty breathing,  Go to the ER, call 911. Be sure to take plenty of deep breaths.

## 2017-04-19 NOTE — PROGRESS NOTES
[facility] is a 71year old female. HPI:     Patient went to the ER 6 days ago on April 13, 2017 for increasing shortness of breath. Patient did sign out AMA. She did take the azithromycin as prescribed by the ER physician.     Oxygen 71% on RA wh 0.65 % Nasal Solution 2 sprays by Nasal route nightly. Disp:  Rfl:    Warfarin Sodium 4 MG Oral Tab Take 6 mg by mouth daily. Disp:  Rfl:    Midodrine HCl (PROAMATINE) 10 MG Oral Tab Take 1 tablet by mouth as needed with dialysis (sbp<90).  (Patient eladioin 6/25/2016   • ESRD on dialysis ([facility] 6/25/2016   • Morbid obesity due to excess calories ([facility] 6/25/2016   • Mixed hyperlipidemia 6/25/2016   • Low HDL (under 40) 6/25/2016   • Morbid obesity with BMI of 50.0-59.9, adult ([facility] 1/16/2017   • Bilateral edema Temp(Src) 98.2 °F (36.8 °C) (Oral)  Ht   Wt   SpO2 95% Estimated body mass index is 48.21 kg/(m^2) as calculated from the following:    Height as of 4/14/17: 62\". Weight as of 4/13/17: 263 lb 10.7 oz. Physical Exam   Nursing note and vitals reviewed. (primary encounter diagnosis)  Cap (community acquired pneumonia)  Elevated serum protein level  Shortness of breath  Chronic atrial fibrillation (hcc)  Anticoagulated on warfarin  Hypoxia    1.  Pneumonia of right lower lobe due to infectious organism [facility]

## 2017-04-23 VITALS
SYSTOLIC BLOOD PRESSURE: 121 MMHG | HEART RATE: 94 BPM | DIASTOLIC BLOOD PRESSURE: 73 MMHG | OXYGEN SATURATION: 95 % | TEMPERATURE: 98 F

## 2017-04-23 PROBLEM — J18.9 PNEUMONIA OF RIGHT LOWER LOBE DUE TO INFECTIOUS ORGANISM: Status: ACTIVE | Noted: 2017-04-23

## 2017-04-23 PROBLEM — Z79.01 ANTICOAGULATED ON WARFARIN: Status: ACTIVE | Noted: 2017-04-23

## 2017-04-23 PROBLEM — J18.9 CAP (COMMUNITY ACQUIRED PNEUMONIA): Status: ACTIVE | Noted: 2017-04-23

## 2017-04-23 PROBLEM — R77.9 ELEVATED SERUM PROTEIN LEVEL: Status: ACTIVE | Noted: 2017-04-23

## 2017-04-26 ENCOUNTER — OFFICE VISIT (OUTPATIENT)
Dept: FAMILY MEDICINE CLINIC | Facility: CLINIC | Age: 72
End: 2017-04-26

## 2017-04-26 VITALS
OXYGEN SATURATION: 93 % | SYSTOLIC BLOOD PRESSURE: 136 MMHG | TEMPERATURE: 99 F | HEART RATE: 103 BPM | DIASTOLIC BLOOD PRESSURE: 68 MMHG

## 2017-04-26 DIAGNOSIS — J18.9 PNEUMONIA OF RIGHT LOWER LOBE DUE TO INFECTIOUS ORGANISM: Primary | ICD-10-CM

## 2017-04-26 DIAGNOSIS — Z99.2 ESRD ON DIALYSIS (HCC): ICD-10-CM

## 2017-04-26 DIAGNOSIS — I48.20 CHRONIC ATRIAL FIBRILLATION (HCC): ICD-10-CM

## 2017-04-26 DIAGNOSIS — J18.9 CAP (COMMUNITY ACQUIRED PNEUMONIA): ICD-10-CM

## 2017-04-26 DIAGNOSIS — N18.6 ESRD ON DIALYSIS (HCC): ICD-10-CM

## 2017-04-26 DIAGNOSIS — Z09 FOLLOW-UP EXAM: ICD-10-CM

## 2017-04-26 PROCEDURE — 99213 OFFICE O/P EST LOW 20 MIN: CPT | Performed by: FAMILY MEDICINE

## 2017-04-26 NOTE — PROGRESS NOTES
Mak Fuentes is a 71year old female. HPI:     Patient presents for follow-up on pneumonia. Overall she is feeling better. Still coughing but the cough has improved. Cough is not productive as before. Not feeling short of breath like she was.   Pa Bilateral edema of lower extremity 1/16/2017   • Unsteady gait 1/16/2017   • Generalized weakness 1/16/2017   • At risk for falling 7/26/2015   • PUD (peptic ulcer disease) 3/17/2017     EGD with Dr. Lyon Learn October 1, 2013    • High blood pressure    • Sb well-developed. No distress. HENT:   Head: Normocephalic and atraumatic. Mouth/Throat: Oropharynx is clear and moist.   Eyes: Conjunctivae and EOM are normal. Pupils are equal, round, and reactive to light.    Pulmonary/Chest: Effort normal and breath s dialysis [facility])  Patient to request her nephrologist send us a progress note. 5. Follow-up exam  As above in #1. Return in about 5 months (around 9/15/2017) for Chronic Conditions and as needed.

## 2017-05-12 ENCOUNTER — APPOINTMENT (OUTPATIENT)
Dept: LAB | Age: 72
End: 2017-05-12
Attending: INTERNAL MEDICINE
Payer: MEDICARE

## 2017-05-12 DIAGNOSIS — I48.91 ATRIAL FIBRILLATION (HCC): ICD-10-CM

## 2017-05-12 PROCEDURE — 85610 PROTHROMBIN TIME: CPT

## 2017-05-12 PROCEDURE — 36415 COLL VENOUS BLD VENIPUNCTURE: CPT

## 2017-05-23 ENCOUNTER — TELEPHONE (OUTPATIENT)
Dept: FAMILY MEDICINE CLINIC | Facility: CLINIC | Age: 72
End: 2017-05-23

## 2017-05-23 NOTE — TELEPHONE ENCOUNTER
Doc,    Per Leo Howard she said that Dr. Bobby Hairston is aware you are out today and you can call him tomorrow.  Thanks

## 2017-05-24 NOTE — TELEPHONE ENCOUNTER
Call returned to Dr. Jeniffer Cobb: I called and left a message on [phone] for Dr. Jeniffer Cobb to call me back at [phone]. Incoming call received from Dr. Jeniffer Cobb. Patient has been procrastinating with getting procedure done for fistula.   Dr. Jeniffer Cobb request

## 2017-05-26 ENCOUNTER — APPOINTMENT (OUTPATIENT)
Dept: LAB | Age: 72
End: 2017-05-26
Attending: INTERNAL MEDICINE
Payer: MEDICARE

## 2017-05-26 ENCOUNTER — TELEPHONE (OUTPATIENT)
Dept: FAMILY MEDICINE CLINIC | Facility: CLINIC | Age: 72
End: 2017-05-26

## 2017-05-26 DIAGNOSIS — R53.1 GENERALIZED WEAKNESS: Primary | ICD-10-CM

## 2017-05-26 DIAGNOSIS — E66.01 MORBID OBESITY WITH BMI OF 50.0-59.9, ADULT (HCC): ICD-10-CM

## 2017-05-26 DIAGNOSIS — N18.6 ESRD ON DIALYSIS (HCC): ICD-10-CM

## 2017-05-26 DIAGNOSIS — Z99.2 ESRD ON DIALYSIS (HCC): ICD-10-CM

## 2017-05-26 DIAGNOSIS — I48.91 ATRIAL FIBRILLATION (HCC): ICD-10-CM

## 2017-05-26 DIAGNOSIS — I48.20 CHRONIC ATRIAL FIBRILLATION (HCC): ICD-10-CM

## 2017-05-26 PROCEDURE — 85610 PROTHROMBIN TIME: CPT

## 2017-05-26 PROCEDURE — 36415 COLL VENOUS BLD VENIPUNCTURE: CPT

## 2017-06-02 ENCOUNTER — PRIOR ORIGINAL RECORDS (OUTPATIENT)
Dept: OTHER | Age: 72
End: 2017-06-02

## 2017-06-06 ENCOUNTER — TELEPHONE (OUTPATIENT)
Dept: FAMILY MEDICINE CLINIC | Facility: CLINIC | Age: 72
End: 2017-06-06

## 2017-06-06 NOTE — TELEPHONE ENCOUNTER
Pt called and said she needs Dr. Dex Correa to fill out the 10 question order form and get it sent back. She is in terrible need of a wheelchair because the break ir faulty and she is afraid she is going to fall.

## 2017-06-08 NOTE — TELEPHONE ENCOUNTER
Pt called back and said she needs this form completed ASAP because the brake on her wheelchair keeps coming off and she is afrain she is going to fall. She said nothing can be done until that form is completed and sent back.   I told her I would put anothe

## 2017-06-14 ENCOUNTER — OFFICE VISIT (OUTPATIENT)
Dept: FAMILY MEDICINE CLINIC | Facility: CLINIC | Age: 72
End: 2017-06-14

## 2017-06-14 VITALS
WEIGHT: 268.94 LBS | BODY MASS INDEX: 49.49 KG/M2 | HEIGHT: 62 IN | HEART RATE: 94 BPM | DIASTOLIC BLOOD PRESSURE: 52 MMHG | SYSTOLIC BLOOD PRESSURE: 119 MMHG

## 2017-06-14 DIAGNOSIS — R29.898 WEAKNESS OF BOTH LOWER EXTREMITIES: ICD-10-CM

## 2017-06-14 DIAGNOSIS — I48.20 CHRONIC ATRIAL FIBRILLATION (HCC): ICD-10-CM

## 2017-06-14 DIAGNOSIS — I27.20 PULMONARY HYPERTENSION (HCC): ICD-10-CM

## 2017-06-14 DIAGNOSIS — R26.81 UNSTEADY GAIT: ICD-10-CM

## 2017-06-14 DIAGNOSIS — Z79.01 ANTICOAGULATED ON WARFARIN: ICD-10-CM

## 2017-06-14 DIAGNOSIS — Z99.2 ESRD ON DIALYSIS (HCC): ICD-10-CM

## 2017-06-14 DIAGNOSIS — Z01.818 PREOP EXAMINATION: Primary | ICD-10-CM

## 2017-06-14 DIAGNOSIS — N18.6 ESRD ON DIALYSIS (HCC): ICD-10-CM

## 2017-06-14 DIAGNOSIS — R60.0 BILATERAL EDEMA OF LOWER EXTREMITY: ICD-10-CM

## 2017-06-14 PROCEDURE — 99215 OFFICE O/P EST HI 40 MIN: CPT | Performed by: FAMILY MEDICINE

## 2017-06-14 NOTE — H&P
Elena Sprague is a 71year old female. HPI:     Patient is accompanied by her  who is pleasant and supportive.     This is a 23-year-old morbidly obese  female with multiple medical conditions dependent on a wheelchair for mobility who T PO WITH SNACKS Disp:  Rfl: 5   Cholecalciferol (VITAMIN D3) 12892 UNITS Oral Cap Take by mouth every 30 (thirty) days. Disp:  Rfl:    Saline Nasal Spray 0.65 % Nasal Solution 2 sprays by Nasal route nightly.  Disp:  Rfl:    Warfarin Sodium 4 MG Oral Tab CHF (congestive heart failure) (HCC)    • PVD (peripheral vascular disease) (HCC)    • Anemia    • ISABELLE (obstructive sleep apnea)    • Hypothyroidism    • Frequent UTI    • Depression    • Atrial fibrillation with slow ventricular response ([facility] [address] 7/27/2015 10/15/2010    Comment debridement skin and sofr tissue of buttocks    OTHER SURGICAL HISTORY  1/1/2002    Comment ex.  axillary mass    FEMUR FRACTURE SURGERY  MARCH 2013      Social History:    Smoking Status: Never Smoker                      Smokeless St irregularly irregular rhythm present. Edema present. Pulmonary/Chest: Effort normal and breath sounds normal. She has no wheezes. She has no rales. Abdominal: Soft. Bowel sounds are normal. There is no tenderness. There is no rebound and no guarding. Weakness of both lower extremities  Order for heavy duty manual wheelchair    9. Bilateral edema of lower extremity  Order for heavy duty manual wheelchair            Return in about 3 months (around 9/14/2017) for Chronic Conditions and as needed.

## 2017-06-17 PROBLEM — R29.898 WEAKNESS OF BOTH LOWER EXTREMITIES: Status: ACTIVE | Noted: 2017-06-17

## 2017-06-17 PROBLEM — I27.20 PULMONARY HYPERTENSION (HCC): Status: ACTIVE | Noted: 2017-06-17

## 2017-06-17 PROBLEM — J18.9 CAP (COMMUNITY ACQUIRED PNEUMONIA): Status: RESOLVED | Noted: 2017-04-23 | Resolved: 2017-06-17

## 2017-06-17 PROBLEM — J18.9 PNEUMONIA OF RIGHT LOWER LOBE DUE TO INFECTIOUS ORGANISM: Status: RESOLVED | Noted: 2017-04-23 | Resolved: 2017-06-17

## 2017-06-17 PROBLEM — R26.9 ABNORMALITY OF GAIT AND MOBILITY: Status: ACTIVE | Noted: 2017-06-17

## 2017-06-19 ENCOUNTER — TELEPHONE (OUTPATIENT)
Dept: FAMILY MEDICINE CLINIC | Facility: CLINIC | Age: 72
End: 2017-06-19

## 2017-06-19 NOTE — TELEPHONE ENCOUNTER
Spoke to patient and informed her that the form/letter was faxed to [facility] 1 hour ago. She will call our office if any further questions.

## 2017-06-20 ENCOUNTER — APPOINTMENT (OUTPATIENT)
Dept: LAB | Age: 72
End: 2017-06-20
Attending: INTERNAL MEDICINE
Payer: MEDICARE

## 2017-06-20 DIAGNOSIS — I48.91 ATRIAL FIBRILLATION (HCC): ICD-10-CM

## 2017-06-20 PROCEDURE — 85610 PROTHROMBIN TIME: CPT

## 2017-06-20 PROCEDURE — 36415 COLL VENOUS BLD VENIPUNCTURE: CPT

## 2017-06-21 ENCOUNTER — TELEPHONE (OUTPATIENT)
Dept: FAMILY MEDICINE CLINIC | Facility: CLINIC | Age: 72
End: 2017-06-21

## 2017-06-26 ENCOUNTER — TELEPHONE (OUTPATIENT)
Dept: FAMILY MEDICINE CLINIC | Facility: CLINIC | Age: 72
End: 2017-06-26

## 2017-06-26 NOTE — TELEPHONE ENCOUNTER
Spoke to Darien Agustin and form was faxed over this AM to [employer]. In fact it was faxed twice.

## 2017-06-30 ENCOUNTER — TELEPHONE (OUTPATIENT)
Dept: FAMILY MEDICINE CLINIC | Facility: CLINIC | Age: 72
End: 2017-06-30

## 2017-06-30 NOTE — TELEPHONE ENCOUNTER
Pt had called earlier today stating that the forms that were filled out for her wheelchair were not completed. Pulled up the forms and found that questions 1 &2 were not filled out.   So completed these questions and re-faxed back to [facility] at [phone]

## 2017-07-06 ENCOUNTER — APPOINTMENT (OUTPATIENT)
Dept: LAB | Age: 72
End: 2017-07-06
Attending: INTERNAL MEDICINE
Payer: MEDICARE

## 2017-07-06 DIAGNOSIS — I48.91 ATRIAL FIBRILLATION (HCC): ICD-10-CM

## 2017-07-06 LAB
INR BLD: 2.99 (ref 0.89–1.11)
PSA SERPL DL<=0.01 NG/ML-MCNC: 31.7 SECONDS (ref 12–14.3)

## 2017-07-06 PROCEDURE — 36415 COLL VENOUS BLD VENIPUNCTURE: CPT

## 2017-07-06 PROCEDURE — 85610 PROTHROMBIN TIME: CPT

## 2017-07-10 ENCOUNTER — PRIOR ORIGINAL RECORDS (OUTPATIENT)
Dept: OTHER | Age: 72
End: 2017-07-10

## 2017-08-11 ENCOUNTER — APPOINTMENT (OUTPATIENT)
Dept: LAB | Age: 72
End: 2017-08-11
Attending: FAMILY MEDICINE
Payer: MEDICARE

## 2017-08-11 ENCOUNTER — LAB ENCOUNTER (OUTPATIENT)
Dept: LAB | Age: 72
End: 2017-08-11
Attending: FAMILY MEDICINE
Payer: MEDICARE

## 2017-08-11 DIAGNOSIS — Z01.818 PREOP TESTING: ICD-10-CM

## 2017-08-11 DIAGNOSIS — I48.91 ATRIAL FIBRILLATION (HCC): ICD-10-CM

## 2017-08-11 LAB
ALBUMIN SERPL-MCNC: 2.9 G/DL (ref 3.5–4.8)
ALP LIVER SERPL-CCNC: 145 U/L (ref 55–142)
ALT SERPL-CCNC: 15 U/L (ref 14–54)
APTT PPP: 54 SECONDS (ref 25–34)
AST SERPL-CCNC: 14 U/L (ref 15–41)
BASOPHILS # BLD AUTO: 0.02 X10(3) UL (ref 0–0.1)
BASOPHILS NFR BLD AUTO: 0.3 %
BILIRUB SERPL-MCNC: 0.4 MG/DL (ref 0.1–2)
BUN BLD-MCNC: 34 MG/DL (ref 8–20)
CALCIUM BLD-MCNC: 8.9 MG/DL (ref 8.3–10.3)
CHLORIDE: 95 MMOL/L (ref 101–111)
CO2: 29 MMOL/L (ref 22–32)
CREAT BLD-MCNC: 5.18 MG/DL (ref 0.55–1.02)
EOSINOPHIL # BLD AUTO: 0.15 X10(3) UL (ref 0–0.3)
EOSINOPHIL NFR BLD AUTO: 2.6 %
ERYTHROCYTE [DISTWIDTH] IN BLOOD BY AUTOMATED COUNT: 14.8 % (ref 11.5–16)
GLUCOSE BLD-MCNC: 120 MG/DL (ref 70–99)
HCT VFR BLD AUTO: 35.3 % (ref 34–50)
HGB BLD-MCNC: 10.9 G/DL (ref 12–16)
IMMATURE GRANULOCYTE COUNT: 0.02 X10(3) UL (ref 0–1)
IMMATURE GRANULOCYTE RATIO %: 0.3 %
INR BLD: 2.37 (ref 0.89–1.11)
LYMPHOCYTES # BLD AUTO: 0.6 X10(3) UL (ref 0.9–4)
LYMPHOCYTES NFR BLD AUTO: 10.3 %
M PROTEIN MFR SERPL ELPH: 8.3 G/DL (ref 6.1–8.3)
MCH RBC QN AUTO: 34.5 PG (ref 27–33.2)
MCHC RBC AUTO-ENTMCNC: 30.9 G/DL (ref 31–37)
MCV RBC AUTO: 111.7 FL (ref 81–100)
MONOCYTES # BLD AUTO: 0.47 X10(3) UL (ref 0.1–0.6)
MONOCYTES NFR BLD AUTO: 8 %
NEUTROPHIL ABS PRELIM: 4.58 X10 (3) UL (ref 1.3–6.7)
NEUTROPHILS # BLD AUTO: 4.58 X10(3) UL (ref 1.3–6.7)
NEUTROPHILS NFR BLD AUTO: 78.5 %
PLATELET # BLD AUTO: 127 10(3)UL (ref 150–450)
POTASSIUM SERPL-SCNC: 4.3 MMOL/L (ref 3.6–5.1)
PSA SERPL DL<=0.01 NG/ML-MCNC: 26.3 SECONDS (ref 12–14.3)
RBC # BLD AUTO: 3.16 X10(6)UL (ref 3.8–5.1)
RED CELL DISTRIBUTION WIDTH-SD: 61.4 FL (ref 35.1–46.3)
SODIUM SERPL-SCNC: 133 MMOL/L (ref 136–144)
WBC # BLD AUTO: 5.8 X10(3) UL (ref 4–13)

## 2017-08-11 PROCEDURE — 85610 PROTHROMBIN TIME: CPT

## 2017-08-11 PROCEDURE — 80053 COMPREHEN METABOLIC PANEL: CPT

## 2017-08-11 PROCEDURE — 85025 COMPLETE CBC W/AUTO DIFF WBC: CPT

## 2017-08-11 PROCEDURE — 36415 COLL VENOUS BLD VENIPUNCTURE: CPT

## 2017-08-11 PROCEDURE — 85730 THROMBOPLASTIN TIME PARTIAL: CPT

## 2017-08-30 NOTE — OR PREOP
Clarified with Dr Billy Avelar. Pt does not need bridging with lovenox pre surgery with Dr June Dos Santos. Ok to hold coumadin x 3 days prior to her surgery.  (note will scanned into chart)

## 2017-09-14 NOTE — PROGRESS NOTES
Assumed pt care at 0730  Pt a & o x 4 vss on 2L nc  afib on tele rates in the 110's  cardizem gtt infusing at 5mg/hr  ivf infusing at 50cc/hr  Left arm upper arm incision intact with staples dressing changed became loose and unable to reinforce scant

## 2017-09-14 NOTE — PROGRESS NOTES
Per infection control patient needs one more MRSA swab to be collected and resulted negative to remove the patient from isolation

## 2017-09-14 NOTE — ANESTHESIA PREPROCEDURE EVALUATION
PRE-OP EVALUATION    Patient Name: Luis F Fuentes    Pre-op Diagnosis: end stage renal disease    Procedure(s):  left arm arteriovenous fistula creation with possible graft  SA pending                    MRSA POSITVE    Surgeon(s) and Role:     * [facility]/Hepatic/Renal  Comment: Last dialysis yesterday. (+) chronic renal disease and ESRD                   Cardiovascular  Comment: Patient evaluated by cardiology and deemed acceptable risk for surgery.   Patient HR currently elevated, but she is n exam normal.  Breath sounds clear to auscultation bilaterally. Other findings            ASA: 4   Plan: general  NPO status verified and patient meets guidelines. Post-procedure pain management plan discussed with surgeon and patient.

## 2017-09-14 NOTE — BRIEF OP NOTE
Pre-Operative Diagnosis: end stage renal disease     Post-Operative Diagnosis: same     Procedure Performed:   1.  Left upper arm basilic vein transposition                   Surgeon(s) and Role:     Rebecca Carnes MD - Primary    Assistant(s):  Surg

## 2017-09-14 NOTE — OPERATIVE REPORT
Pre-Operative Diagnosis: end stage renal disease     Post-Operative Diagnosis: same     Procedure Performed:   1.  Left upper arm basilic vein transposition                   Surgeon(s) and Role:     Missy Santoro MD - Primary    Assistant(s):  Surg tunnel that would be palpable in comfortable for the patient during dialysis. I passed the vein through the tunnel and had just enough vein to create the anastomosis without any tension. Confirmed the vein was not twisted and irrigated well.   The vein wa

## 2017-09-14 NOTE — CONSULTS
[facility]  Report of Consultation    Geoff Brown Patient Status:  Outpatient in a Bed    1945 MRN [id number]   [facility] 7NE-A Attending Tiffanie Huntley MD   Hosp Day # 1 PCP Cass Thomson DO     Reason for [facility], [facility] ([facility]) (obstructive sleep apnea)    • Osteoarthritis    • Peripheral vascular disease ([facility]    • Pneumonia of right lower lobe due to infectious organism ([facility] 4/23/2017    vs b/l LL vs not pneumonia ie vs other    • Pressure ulcer, heel(817.07)     left heel   • **OR** morphINE sulfate (PF) 4 MG/ML injection 8 mg, 8 mg, Intravenous, Q1H PRN  •  ondansetron (ZOFRAN-ODT) disintegrating tab 4 mg, 4 mg, Oral, Q6H PRN **OR** ondansetron HCl (ZOFRAN) injection 4 mg, 4 mg, Intravenous, Q6H PRN  •  allopurinol (ZYLOPRIM) and she appears well compensated.   -rate control with CCB   -?low dose beta blockers as outpt   -anticoagulation when ok with Dr. Lizzeth Sutton  2.  ESRD - dialysis    Xochilt Collazo MD  9/14/2017  3:15 PM

## 2017-09-14 NOTE — H&P
[facility]  Vascular Surgery     Sandra Caniqra Patient Status:  Inpatient    1945 MRN [id number]   Location [address] [facility] Attending Jose Myrick MD   Hosp Day # 0 PCP Ramana Pollock DO         History of Prese • Pneumonia of right lower lobe due to infectious organism ([facility] 4/23/2017    vs b/l LL vs not pneumonia ie vs other    • Pressure ulcer, heel(707.07)     left heel   • PUD (peptic ulcer disease) 3/17/2017    EGD with Dr. Gomez Alert October 1, 2013    • Pulm vision, confusion    Physical Exam:  /79 (BP Location: Right arm)   Pulse 89   Temp 97.8 °F (36.6 °C) (Temporal)   Resp 14   Ht 5' 2\" (1.575 m)   Wt 260 lb (117.9 kg)   BMI 47.55 kg/m²   GENERAL: alert and orientated X 3, well developed, well nouris

## 2017-09-14 NOTE — HISTORICAL OFFICE NOTE
Tea Floyd  : 1945  ACCOUNT:  [id number]  [phone]  PCP: Dr. Jayy Erazo    TODAY'S DATE: 2017  DICTATED BY:  Kahlil Brown M.D.]    CHIEF COMPLAINT: [Followup of Atrial fibrillation, chronic, Followup of DM, Type II and F overweight. WEIGHT: Unable to calculate BMI. HEAD/FACE: no trauma and normocephalic. EYES: conjunctivae not injected and no xanthelasma. ENT: mucosa pink and moist. NECK: jugular venous pressure not elevated.  RESP: respirations with normal rate and rhythm, BEDTIME  01/28/13 Allopurinol           100MG     1 tablet 3 times a day  01/28/13 Levothyroxine Sodium  125MCG    1 daily      Severiano Mention, M.D.    KARY/claire - DD: 03/27/2017 - DT: 03/30/2017 - Job ID: [id number]   C: Dr. Judi Sanchez

## 2017-09-14 NOTE — ANESTHESIA POSTPROCEDURE EVALUATION
[address] [facility] Patient Status:  Observation   Age/Gender 71year old female MRN [id number]   Location [address] [facility] Attending Shawn Hicks MD   [address] Day # 1 PCP Rogerio Santiago DO       Anesthesia Post-

## 2017-09-14 NOTE — CONSULTS
[facility]  Report of Consultation    Sabine Tidwell Patient Status:  Observation    1945 MRN [id number]   [facility] 7NE-A Attending Audi Gaytan MD   Hosp Day # 1 PCP Lucila Lopez DO     Reason for Consultation:  ESRD Muscle weakness    • NSVT (nonsustained ventricular tachycardia) ([facility]) 10/5/2013   • ISABELLE (obstructive sleep apnea)    • Osteoarthritis    • Peripheral vascular disease ([facility]    • Pneumonia of right lower lobe due to infectious organism ([facility] 4/23/2017    v Q1H PRN **OR** morphINE sulfate (PF) 4 MG/ML injection 4 mg, 4 mg, Intravenous, Q1H PRN **OR** morphINE sulfate (PF) 4 MG/ML injection 8 mg, 8 mg, Intravenous, Q1H PRN  •  ondansetron (ZOFRAN-ODT) disintegrating tab 4 mg, 4 mg, Oral, Q6H PRN **OR** ondanse lb    General: Alert and oriented in no apparent distress. HEENT: No scleral icterus, MMM  Neck: Supple, no OKSANA or thyromegaly  Cardiac: tachy, irreg/irreg, S1, S2 normal, no murmur or rub  Lungs: Clear without wheezes, rales, rhonchi.     Abdomen: Soft, n per usual routine. She is stable clinically today. #2.  S/p L upper arm vein transposition- per Dr. Sepulveda Harness    #3. Afib/RVR- on cardizem gtt with improved rates. Per cards    Thank you for allowing me to participate in the care of your patient.  Please

## 2017-09-14 NOTE — CONSULTS
[facility]  MORGAN/ Tone Perez [address] [facility] Patient Status:  Observation    1945 MRN [id number]   Location [facility] Attending Viet Linder MD   [facility] Day # 1 PCP Fabiola Moran DO     Reason for consult: (nonsustained ventricular tachycardia) ([facility] 10/5/2013   • ISABELLE (obstructive sleep apnea)    • Osteoarthritis    • Peripheral vascular disease ([facility]    • Pneumonia of right lower lobe due to infectious organism ([facility] 4/23/2017    vs b/l LL vs not pneumonia omeprazole 20 MG Oral Capsule Delayed Release TAKE 1 CAPSULE(20 MG) BY MOUTH EVERY MORNING Disp: 90 capsule Rfl: 1   ammonium lactate (LAC-HYDRIN) 12 % External Lotion Apply to arms daily.  Disp: 500 g Rfl: 2   RENVELA 800 MG Oral Tab TK 2 TS PO TID WITH HGB  12.0   MCV  113.3*   PLT  122.0*       Recent Labs   Lab  09/14/17   0620   GLU  109*   BUN  20   CREATSERUM  3.84*   CA  9.5   ALB  3.2*   NA  135*   K  4.0   CL  97*   CO2  28.0   ALKPHO  158*   AST  22   ALT  22   BILT  0.7   TP  9.4*       Recen

## 2017-09-15 PROBLEM — E66.01 MORBID OBESITY WITH BMI OF 45.0-49.9, ADULT (HCC): Status: ACTIVE | Noted: 2017-01-16

## 2017-09-15 NOTE — PROGRESS NOTES
[facility]  Progress Note     Annamaria Xavier Knightcandace Patient Status:  Inpatient    1945 MRN [id number]   [facility] 7NE-A Attending Shawn Hicks MD   [facility] Day # 1 PCP Rogerio Santiago DO       S: Patient seen and examined.  C/o m T/T/S  3. Afib  1. Rate controlled on cardizem gtt  2. AC on hold until ok with vascular  3. Cardiology following   4. GERD  1. PPI  5. Hypothyroidism  1. Synthroid   6. Morbid obesity  1.  BMI 47    Quality:  · DVT Prophylaxis: SCD  · CODE status: full  ·

## 2017-09-15 NOTE — PROGRESS NOTES
· [facility] Progress Note     Subjective:  Mild pain left AV fistula. No dyspnea    Objective:    117/51  Afebrile  AFib 140s earlier, now 90s.        Neuro:awake/alert  HEENT:moist mucosa  Cardiac:S1 S2 irregular  Lungs: clear anterior  Abd

## 2017-09-15 NOTE — PROGRESS NOTES
09/15/17 1651   Clinical Encounter Type   Visited With Patient and family together  (Dayron Hood visited the patient and her family proiding prayer, Scripture, support and SOS for the patiet.)

## 2017-09-15 NOTE — PLAN OF CARE
Assumed patient care at [address], patient alert & oriented X 4, anxious.  Pain 8/10 with relief from norco  LUE with gauze and kerlix over surgical site c/d/i. 2+ radial pulse, CRT <3sec  3+ pitting edema to BLE  Sat>92% on RA, A. Fib 110's monitored on tele, C

## 2017-09-15 NOTE — CM/SW NOTE
09/15/17 1500   CM/SW Referral Data   Referral Source    Reason for Referral Discharge planning   Informant Patient   Pertinent Medical Hx   Primary Care Physician Name Dr Fox Warner   Date of Last Contact with PCP 8/29/2017   Patient Info   A

## 2017-09-15 NOTE — PLAN OF CARE
Assumed care @ 0700. AOx4. Left arm dressing changed twice; c/d/i  PO lopressor started; cardizem gtt d/c'd. Norco for pain control. NSR on tele. Vitals stable. Dialysis scheduled for tomorrow with possible d/c after. Will continue to monitor.     CA

## 2017-09-15 NOTE — PROGRESS NOTES
[facility]  Nephrology Progress Note    Johnnye Kanner McGpavel Patient Status:  Outpatient in a Bed    1945 MRN [id number]   [facility] 7NE-A Attending Pebbles Helm MD   Hosp Day # 1 PCP Ashanti Tinsley DO       SUBJECTIVE:  No ac 5-325 MG per tab 1 tablet 1 tablet Oral Q4H PRN   Or      HYDROcodone-acetaminophen (NORCO) 5-325 MG per tab 2 tablet 2 tablet Oral Q4H PRN   morphINE sulfate (PF) 2 MG/ML injection 2 mg 2 mg Intravenous Q1H PRN   Or      morphINE sulfate (PF) 4 MG/ML inje

## 2017-09-16 NOTE — PROGRESS NOTES
[facility]  Progress Note     Lois Fallon Patient Status:  Inpatient    1945 MRN [id number]   [facility] 7NE-A Attending Janet Yates MD   [facility] Day # 2 PCP Reji Vazquez DO       S: Patient undergoing HD, hoping to transposition  1. Management per vascular  2. ESRD  1. HD T/T/S  3. Afib  1. Rate controlled on metoprolol  2. Warfarin  3. Cardiology following   4. GERD  1. PPI  5. Hypothyroidism  1. Synthroid   6. Morbid obesity  1.  BMI 47    Quality:  · DVT Prophylaxi

## 2017-09-16 NOTE — PROGRESS NOTES
· [facility] Cardiology Progress Note     Subjective:  No pain or dyspnea.   Awaiting HD    Objective:  103/60  Afebrile  AFib 80-90s       Neuro:awake/alert  HEENT:moist mucosa  Cardiac:S1 S2 irregular  Lungs: clear anterior  Abdomen:soft  Extremities:ch

## 2017-09-16 NOTE — PROGRESS NOTES
[facility]  Nephrology Progress Note    Kenny Caniqra Patient Status:  Outpatient in a Bed    1945 MRN [id number]   [facility] 7NE-A Attending Richard Brown MD   Hosp Day # 2 PCP Amy Prado DO       SUBJECTIVE:  Pt se Medications:  Heparin Sodium (Porcine) 1000 UNIT/ML injection 1,500 Units 1.5 mL Intravenous Once   metoprolol Tartrate (LOPRESSOR) tab 25 mg 25 mg Oral 2x Daily(Beta Blocker)   Warfarin Sodium (COUMADIN) tab 5 mg 5 mg Oral Nightly   HYDROcodone-acetaminop

## 2017-09-16 NOTE — PLAN OF CARE
Assumed care at [address] [place] [address] Road, patient alert and oriented X 4, c/o pain to LUE 5-6/10 with relief from norco  Incision with staples to LUE  With kerlix and gauze dressing c/d/i  3+ pitting edema to BLE , 2+ pedal pulses  Sat >92% on RA, A. Fib controled monitored o

## 2017-09-16 NOTE — PROGRESS NOTES
Dressing changed. Wound bruised but healthy. BP somewhat low during dialysis but no symptoms. Can be discharged from vascular standpoint when ready medically.

## 2017-09-16 NOTE — PLAN OF CARE
Pt BP low during dialysis. Midodrin and Albumin given. BP remainin SBP 89 -90 and pt \"not feeling well or safe to go home\". Dr. Orquidea Ashley notified and Dr. Padmini Goodwin notified,. Pt refusing to be dc'd today. Family at bedside. Will continue to monitor.

## 2017-09-16 NOTE — PHYSICAL THERAPY NOTE
Chart reviewed. Attempted to see patient for PT eval but receiving HD. Will attempt to see later as schedule permits.

## 2017-09-17 NOTE — PLAN OF CARE
Assumed care at 1900. No acute issues overnight. Prn Norco for left arm pain. 2l per NC, CPAP at HS. A fib on tele. HR 90s. Left arm with stapes, ecchymotic. Dressing changed d/t falling off.   +radial pulse, good sensation to left arm.    [address]

## 2017-09-17 NOTE — PLAN OF CARE
NURSING DISCHARGE NOTE    Discharged Home via Wheelchair. Accompanied by Spouse  Belongings Taken by patient/family. Discharge instructions reviewed with patient and spouse including follow up appointments and new medications.

## 2017-09-17 NOTE — OCCUPATIONAL THERAPY NOTE
Received OT evaluation orders, performed chart review and communicated with RN. Per RN pt at baseline and currently has not skilled occupational therapy needs. Will sign off at this time.  Alexis Stubbs, GENARO, OTR/L

## 2017-09-17 NOTE — PROGRESS NOTES
[facility]  Progress Note    Homar Fallon Patient Status:  Inpatient    1945 MRN [id number]   [facility] 7NE-A Attending Jonathan Sandoval MD   [facility] Day # 3 PCP Wesley Mccoy DO       Assessment and Plan:  Patient Active Pr °C)      Intake/Output:    Intake/Output Summary (Last 24 hours) at 09/17/17 1155  Last data filed at 09/17/17 0900   Gross per 24 hour   Intake              260 ml   Output              650 ml   Net             -390 ml       Wt Readings from Last 3 Encoun ondansetron HCl (ZOFRAN) injection 4 mg 4 mg Intravenous Q6H PRN   allopurinol (ZYLOPRIM) tab 100 mg 100 mg Oral BID   Levothyroxine Sodium (SYNTHROID, LEVOTHROID) tab 125 mcg 125 mcg Oral Before breakfast   Pantoprazole Sodium (PROTONIX) EC tab 20 mg 20

## 2017-09-17 NOTE — PROGRESS NOTES
[facility]  Progress Note     Corrinne Christen Richardland Patient Status:  Inpatient    1945 MRN [id number]   [facility] 7NE-A Attending Bere Stephen MD   [facility] Day # 3 PCP Bhumika Rivera,        S: Patient didn't feel well past HD controlled on metoprolol  2. Warfarin, slow drift up  3. Cardiology following   4. GERD  1. PPI  5. Hypothyroidism  1. Synthroid   6. Morbid obesity  1.  BMI 47    Quality:  · DVT Prophylaxis: SCD  · CODE status: full  · Marie: no  · Central line: no    Est

## 2017-09-17 NOTE — PROGRESS NOTES
[facility]  Nephrology Progress Note    Maynor Norris Patient Status:  Outpatient in a Bed    1945 MRN [id number]   [facility] 7NE-A Attending Otilio Meigs, MD   [facility] Day # 3 PCP Charly Timmons DO       SUBJECTIVE:  D/c h Facility-Administered Medications:   Albumin Human (ALBUMINAR) 25 % solution 25 g 25 g Intravenous PRN Dialysis   Midodrine HCl (PROAMATINE) tab 10 mg 10 mg Oral BID PRN   docusate sodium (COLACE) cap 100 mg 100 mg Oral BID   metoprolol Tartrate (LOPRESSOR)

## 2017-09-18 NOTE — PROGRESS NOTES
HPI:    Lucy Mo is a 71year old female here today for hospital follow up.    She was discharged from Inpatient hospital, [facility] to Home   Admission Date: 9/14/17   Discharge Date: 9/17/17  Hospital Discharge Diagnosis: End-stage renal as needed for Pain. Cyanocobalamin (B-12) 500 MCG Oral Tab Take by mouth 2 (two) times daily.    [DISCONTINUED] Levothyroxine Sodium 125 MCG Oral Tab Take 1 tablet (125 mcg total) by mouth before breakfast.   [DISCONTINUED] omeprazole 20 MG Oral Capsule D metabolic (52/4/3302); ESRD on dialysis [facility]) (6/25/2016); Frequent UTI; Generalized weakness (1/16/2017); GI bleed (10/5/2013); HTN (hypertension); Hypothyroidism; Low HDL (under 40) (6/25/2016);  Lymphedema; Methicillin resistant Staphylococcus aureus in patient is not nervous/anxious. PHYSICAL EXAM:   No LMP recorded. Patient is not currently having periods (Reason: Menopause). Estimated body mass index is 50.54 kg/m² as calculated from the following:    Height as of 8/11/17: 62\".     Weight as of diabetes mellitus, type II  -     HEMOGLOBIN A1C; Future      Case discussed with Dr. Sari Johnson nurse over the phone. Patient will follow up sooner this week prior to her scheduled postop visit for wound check.     1. ESRD (end stage renal disease) ([facility]  S

## 2017-09-18 NOTE — CM/SW NOTE
09/18/17 0800   Discharge disposition   Discharged to: Home or Self   Name of [facility] services after discharge None   Discharge transportation Private car

## 2017-09-18 NOTE — PROGRESS NOTES
Attempted to reach pt for TCM.  states pt cannot come to the phone right now and refused to have NCM call back. He states pt will be coming in for HFU apt with PCP this afternoon at 3pm and will review all d/c instructions/meds at that time.   Will

## 2017-09-19 NOTE — TELEPHONE ENCOUNTER
Jen Ladd [facility] is calling to inform Dr. Sravani Lynch that home health care will be delayed by one day due to Roosevelt Santiago having to go to dialysis today. They will start tomorrow.     Message routed as [facility]

## 2017-09-20 PROBLEM — I48.21 PERMANENT ATRIAL FIBRILLATION (HCC): Status: ACTIVE | Noted: 2017-01-01

## 2017-09-20 PROBLEM — Z99.81 DEPENDENCE ON NOCTURNAL OXYGEN THERAPY: Status: ACTIVE | Noted: 2017-01-01

## 2017-09-20 NOTE — TELEPHONE ENCOUNTER
Izabela Gates from [facility] called as she is starting to open the case for this patient and she asked 1) if Dr. Brayden Bates will sign the orders?- yes, since Dr. Brayden Bates has signed [facility] orders for pt before ( 2016 from another service)   2)  Can they do a pulse ox at each

## 2017-09-22 ENCOUNTER — PRIOR ORIGINAL RECORDS (OUTPATIENT)
Dept: OTHER | Age: 72
End: 2017-09-22

## 2017-09-22 NOTE — TELEPHONE ENCOUNTER
Jonelle Carter from [place] [address] is calling to inform pt is not going to be start occupational therapy until next week due to dialysis.

## 2017-09-28 NOTE — TELEPHONE ENCOUNTER
Spoke with pt and informed her of test results, pt verbalized understanding and had no questions at this time.

## 2017-09-28 NOTE — TELEPHONE ENCOUNTER
----- Message from Ashanti Tinsley DO sent at 9/28/2017  1:07 PM CDT -----  Lipids to goal or stable. Uric acid level is to goal.  A1c is still in normal range.   TFTs normal/stable

## 2017-10-02 NOTE — TELEPHONE ENCOUNTER
Elsi from [facility] is calling for the results of Susanne's Hemoglobin A1C, please call Elsi at [phone]

## 2017-10-03 NOTE — TELEPHONE ENCOUNTER
Spoke to patient today because after discussing with Dr. Marylee Collie pt is to see Dr. Jaylen Villareal at [place] at [phone] and also to come in for a visit to see Dr. Marylee Collie because Dr. Joe Goodwin to Dr. Logan Nelson today regarding patient.  Pt made appt for 10/23/1

## 2017-10-18 NOTE — TELEPHONE ENCOUNTER
[address] is currently seeing the patient every 2 weeks. She saw her today and next visit will be 11/1/17. [address] is requesting weekly visits for 4 weeks starting 10/22/17. She states that someone is coming on Friday to teach Delroy Lilly how to check her INR.   Her nex

## 2017-10-20 ENCOUNTER — PRIOR ORIGINAL RECORDS (OUTPATIENT)
Dept: OTHER | Age: 72
End: 2017-10-20

## 2017-10-23 PROBLEM — G57.93 NEUROPATHY INVOLVING BOTH LOWER EXTREMITIES: Status: ACTIVE | Noted: 2017-01-01

## 2017-10-23 NOTE — PROGRESS NOTES
Horacio Hodgkin is a 71year old female. HPI:     Patient is accompanied by her  was pleasant and supportive. Patient complains of bilateral lower extremity pain.   The pain is on the bottom of her feet as well as anterior ankle area is where s (LAC-HYDRIN) 12 % External Lotion Apply to arms daily. Disp: 500 g Rfl: 2   RENVELA 800 MG Oral Tab TK 2 TS PO TID WITH MEALS AND 1 T PO WITH SNACKS Disp:  Rfl: 5   Cholecalciferol (VITAMIN D3) 77732 UNITS Oral Cap Take by mouth every 30 (thirty) days.    D with controlled ventricular response ([facility] 11/22/2012   • Atrial fibrillation with slow ventricular response [facility] 7/27/2015   • Bilateral edema of lower extremity 1/16/2017   • Cancer ([facility]     skin cancer on both arm-it was removed.     • CHF (congestive 1/16/2017   • Visual impairment       Past Surgical History:  MARCH 2013: FEMUR FRACTURE SURGERY  10/15/2010: OTHER SURGICAL HISTORY      Comment: debridement skin and sofr tissue of buttocks  1/1/2002: OTHER SURGICAL HISTORY      Comment: ex. axillary mas sits in a wheelchair or in a recliner at home most of the time. Discussed with patient would be beneficial for her to get up and move around. 1. Bilateral edema of lower extremity  Chronic and likely also contributing to patient's lower extremity pain.

## 2017-10-26 NOTE — TELEPHONE ENCOUNTER
Successfully faxed Home Health Orders to [facility] at [phone].     Order Number: [id number], [id number]     Certification Period: 9/20/2017-11/18/2017

## 2017-10-29 NOTE — DISCHARGE SUMMARY
Vascular Surgery  Surgical Discharge Summary    Admission Date: 9/14/2017   D/C Date: 9/17/2017  Discharge Diagnosis: ESRD  Discharge Condition: Good      HISTORY OF PRESENT ILLNESS: Elizabeth Childers is a 71year old  female who was admitted for metoprolol Tartrate 25 MG Oral Tab Take 1 tablet (25 mg total) by mouth 2x Daily(Beta Blocker). Disp: 60 tablet Rfl: 11   Cyanocobalamin (B-12) 500 MCG Oral Tab Take by mouth 2 (two) times daily.  Disp:  Rfl:    ammonium lactate (LAC-HYDRIN) 12 % External ESRD.    EXAM ON DISCHARGE:  BP 92/46 (BP Location: Right arm)   Pulse 102   Temp 98.3 °F (36.8 °C) (Oral)   Resp 17   Ht 5' 2\" (1.575 m)   Wt 276 lb 7.3 oz (125.4 kg)   SpO2 100%   BMI 50.56 kg/m²       palpable thrill in graft    DISCHARGE INSTRUCTIONS:

## 2017-11-02 NOTE — TELEPHONE ENCOUNTER
[facility] at [phone].     Order Number: [id number]    Certification Period: 9/20/2017-11/18/2017

## 2017-11-06 NOTE — TELEPHONE ENCOUNTER
Alise Canas  informed insurance was verified and CT  L-spine wo  is a covered benefit and does not require authorization. Can proceed with scheduling appt.

## 2017-11-06 NOTE — PATIENT INSTRUCTIONS
- if you are getting the ct scan of your low back, then you do not have to get the xrays. - if you are not getting the ct scan, get the xrays of your low back.    - consider nerve medicine gabapentin as a cream  - schedule lymphedema therapy  - follow up i at time of . If a designated family member will be picking up prescription, office must be given name of individual in advance and they must present an ID as well.   · The name of the person picking up your prescription must be documented in your claudia

## 2017-11-06 NOTE — PROGRESS NOTES
History of Present Illness:   Patient presents with:  Musculoskeletal Problem: New patient referred by Dr Jamaica Vaca for lymphedema to legs for last 10 years.  For last 2 years using wheelchair, uses walker when getting out of wheelchair and to walk short dist Bilateral edema of lower extremity 1/16/2017   • [facility] ([facility]     skin cancer on both arm-it was removed.     • CHF (congestive heart failure) (HCC)    • Chronic atrial fibrillation ([facility] 6/25/2016   • Closed fracture of unspecified part of lower end of fem HISTORY      Comment: debridement skin and sofr tissue of buttocks  1/1/2002: OTHER SURGICAL HISTORY      Comment: ex. axillary mass      Celebrex [Celecoxib]    Rash    Comment:CAPS  Clindamycin             Nausea only      Current Outpatient Prescription • Diabetes Brother          Social History  Social History   Marital status:   Spouse name: N/A    Years of education: N/A  Number of children: N/A     Occupational History  None on file     Social History Main Topics   Smoking status: Never Smoke tibialis, and femoral miguel  Neurologic: Cranial nerves are grossly intact. Reflexes: absent miguel lower extremities, negative babinski mgiuel. Sensory: reports decr cold touch miguel feet.   Strength:  5/5 miguel lower extremities except miguel hip flexion 4+  Palpati

## 2017-11-08 PROBLEM — I95.9 HYPOTENSION: Status: ACTIVE | Noted: 2017-01-01

## 2017-11-08 PROBLEM — Z00.00 MEDICARE ANNUAL WELLNESS VISIT, SUBSEQUENT: Status: ACTIVE | Noted: 2017-01-01

## 2017-11-08 NOTE — TELEPHONE ENCOUNTER
Pt is calling her arthritis in her knees is getting worse and would like to know what Dr. Jamaica Vaca would recommend. Pt would like to know if it is ok to put Aspercream on her knees for the pain. Please call her back at [phone].

## 2017-11-08 NOTE — PROGRESS NOTES
HPI:   Pete Barnhart is a 71year old female who presents for a Medicare Subsequent Annual Wellness visit (Pt already had Initial Annual Wellness).     Hypotension:  Visiting nurse today took BP and was 122/something, but previously systolic in the 90 ALT 22 09/14/2017   CA 9.5 09/14/2017   ALB 3.2 (L) 09/14/2017   TSH 4.940 09/27/2017   CREATSERUM 3.84 (H) 09/14/2017    (H) 09/14/2017        CBC  (most recent labs)     Lab Results  Component Value Date   WBC 7.0 09/14/2017   HGB 12.0 09/14/201 LE      MEDICAL INFORMATION:   She  has a past medical history of Abnormality of gait and mobility (6/17/2017); Anemia; Anticoagulated on warfarin (4/23/2017); Arrhythmia; At risk for falling (7/26/2015); Atrial fibrillation ([facility]  Atrial fibrillation with Her family history includes Diabetes in her brother; Heart Disease in her brother. SOCIAL HISTORY:   She  reports that she has never smoked. She has never used smokeless tobacco. She reports that she does not drink alcohol or use drugs.      REVIEW OF subsequent  (primary encounter diagnosis)  Postmenopausal  Hypocalcemia  Screening for malignant neoplasm of breast  Neuropathy involving both lower extremities  Hypotension, unspecified hypotension type    1.  Medicare annual wellness visit, subsequent  Pa Bennett Golden is unable to use daily aspirin therapy For the following reasons:   Already on other anticoagulant usage such as Plavix, Xarelto, Lovenox, or warfarin            Diet assessment: fair     Advanced Directive:  Living Will on file in [place]?   Jaquelin Andre Assessment          Have you fallen in the last 12 months?: 0-No                                        Fall/Risk Scorin          Depression Screening (PHQ-2/PHQ-9): Over the LAST 2 WEEKS   Little interest or pleasure in doing things (over the last two applicable    Flex Sigmoidoscopy Screen every 10 years No results found for this or any previous visit. No flowsheet data found.      Fecal Occult Blood Annually OCCULT BLOOD RESULT (no units)   Date Value   02/26/2014 RESULT SPECIMEN 1[de-identified] NEGATIVE FOR OCCUL Part B No vaccine history found This may be covered with your pharmacy  prescription benefits        SPECIFIC DISEASE MONITORING Internal Lab or Procedure External Lab or Procedure   Annual Monitoring of Persistent     Medications (ACE/ARB, digoxin diureti

## 2017-11-09 NOTE — TELEPHONE ENCOUNTER
I recommend patient discuss this with Dr. Sarah Haider at her next appointment with her. I do not recommend the Aspercreme.

## 2017-11-15 NOTE — PROGRESS NOTES
Lm with office who will page Dr Andre Ojeda  [address], [facility], [address] [place] [address]   [phone]  Regarding can patient use topical gabapentin-mk

## 2017-12-01 NOTE — TELEPHONE ENCOUNTER
----- Message from Prince Ryan MD sent at 11/30/2017 11:23 PM CST -----  Viewed pls call - xrays show arthritis in the low back and disc degeneration at the bottom disc.   She needs to get the ct of her low back if she can. -buddy

## 2017-12-01 NOTE — TELEPHONE ENCOUNTER
S/w pt to review xray results and for her to schedule CT scan. Patient stated she has f/u appt on 12/6/17 and would like to reschedule till after she gets CT scan done.

## 2018-01-01 ENCOUNTER — TELEPHONE (OUTPATIENT)
Dept: FAMILY MEDICINE CLINIC | Facility: CLINIC | Age: 73
End: 2018-01-01

## 2018-01-01 ENCOUNTER — HOSPITAL ENCOUNTER (INPATIENT)
Facility: HOSPITAL | Age: 73
LOS: 17 days | Discharge: NURSING, REHAB OR LONG-TERM CARE | DRG: 291 | End: 2018-01-01
Attending: EMERGENCY MEDICINE | Admitting: INTERNAL MEDICINE
Payer: MEDICARE

## 2018-01-01 ENCOUNTER — APPOINTMENT (OUTPATIENT)
Dept: GENERAL RADIOLOGY | Facility: HOSPITAL | Age: 73
DRG: 291 | End: 2018-01-01
Attending: INTERNAL MEDICINE
Payer: MEDICARE

## 2018-01-01 ENCOUNTER — APPOINTMENT (OUTPATIENT)
Dept: LAB | Age: 73
End: 2018-01-01
Attending: FAMILY MEDICINE
Payer: MEDICARE

## 2018-01-01 ENCOUNTER — OFFICE VISIT (OUTPATIENT)
Dept: PHYSICAL THERAPY | Age: 73
End: 2018-01-01
Attending: FAMILY MEDICINE
Payer: MEDICARE

## 2018-01-01 ENCOUNTER — MED REC SCAN ONLY (OUTPATIENT)
Dept: FAMILY MEDICINE CLINIC | Facility: CLINIC | Age: 73
End: 2018-01-01

## 2018-01-01 ENCOUNTER — APPOINTMENT (OUTPATIENT)
Dept: GENERAL RADIOLOGY | Facility: HOSPITAL | Age: 73
DRG: 291 | End: 2018-01-01
Attending: NURSE PRACTITIONER
Payer: MEDICARE

## 2018-01-01 ENCOUNTER — SNF VISIT (OUTPATIENT)
Dept: INTERNAL MEDICINE CLINIC | Age: 73
End: 2018-01-01

## 2018-01-01 ENCOUNTER — APPOINTMENT (OUTPATIENT)
Dept: PHYSICAL THERAPY | Age: 73
End: 2018-01-01
Attending: FAMILY MEDICINE
Payer: MEDICARE

## 2018-01-01 ENCOUNTER — APPOINTMENT (OUTPATIENT)
Dept: GENERAL RADIOLOGY | Facility: HOSPITAL | Age: 73
DRG: 291 | End: 2018-01-01
Attending: EMERGENCY MEDICINE
Payer: MEDICARE

## 2018-01-01 ENCOUNTER — APPOINTMENT (OUTPATIENT)
Dept: CV DIAGNOSTICS | Facility: HOSPITAL | Age: 73
DRG: 291 | End: 2018-01-01
Attending: INTERNAL MEDICINE
Payer: MEDICARE

## 2018-01-01 ENCOUNTER — APPOINTMENT (OUTPATIENT)
Dept: LAB | Age: 73
End: 2018-01-01
Attending: INTERNAL MEDICINE
Payer: MEDICARE

## 2018-01-01 ENCOUNTER — LAB ENCOUNTER (OUTPATIENT)
Dept: LAB | Age: 73
End: 2018-01-01
Attending: FAMILY MEDICINE
Payer: MEDICARE

## 2018-01-01 ENCOUNTER — OFFICE VISIT (OUTPATIENT)
Dept: FAMILY MEDICINE CLINIC | Facility: CLINIC | Age: 73
End: 2018-01-01

## 2018-01-01 ENCOUNTER — APPOINTMENT (OUTPATIENT)
Dept: GENERAL RADIOLOGY | Facility: HOSPITAL | Age: 73
DRG: 291 | End: 2018-01-01
Attending: HOSPITALIST
Payer: MEDICARE

## 2018-01-01 VITALS
SYSTOLIC BLOOD PRESSURE: 112 MMHG | OXYGEN SATURATION: 94 % | TEMPERATURE: 97 F | RESPIRATION RATE: 20 BRPM | DIASTOLIC BLOOD PRESSURE: 69 MMHG | HEART RATE: 70 BPM

## 2018-01-01 VITALS
TEMPERATURE: 97 F | RESPIRATION RATE: 20 BRPM | HEART RATE: 70 BPM | DIASTOLIC BLOOD PRESSURE: 69 MMHG | OXYGEN SATURATION: 94 % | SYSTOLIC BLOOD PRESSURE: 112 MMHG

## 2018-01-01 VITALS
HEIGHT: 62 IN | WEIGHT: 291.38 LBS | BODY MASS INDEX: 53.62 KG/M2 | SYSTOLIC BLOOD PRESSURE: 93 MMHG | HEART RATE: 63 BPM | TEMPERATURE: 99 F | RESPIRATION RATE: 20 BRPM | DIASTOLIC BLOOD PRESSURE: 53 MMHG | OXYGEN SATURATION: 97 %

## 2018-01-01 VITALS
BODY MASS INDEX: 52.46 KG/M2 | SYSTOLIC BLOOD PRESSURE: 96 MMHG | HEIGHT: 62 IN | WEIGHT: 285.06 LBS | HEART RATE: 83 BPM | DIASTOLIC BLOOD PRESSURE: 42 MMHG

## 2018-01-01 DIAGNOSIS — R06.00 DYSPNEA ON EXERTION: ICD-10-CM

## 2018-01-01 DIAGNOSIS — R60.0 BILATERAL EDEMA OF LOWER EXTREMITY: Primary | ICD-10-CM

## 2018-01-01 DIAGNOSIS — I48.91 ATRIAL FIBRILLATION, UNSPECIFIED TYPE (HCC): ICD-10-CM

## 2018-01-01 DIAGNOSIS — M1A.00X0 IDIOPATHIC CHRONIC GOUT WITHOUT TOPHUS, UNSPECIFIED SITE: ICD-10-CM

## 2018-01-01 DIAGNOSIS — G57.93 NEUROPATHY INVOLVING BOTH LOWER EXTREMITIES: ICD-10-CM

## 2018-01-01 DIAGNOSIS — I50.9 ACUTE ON CHRONIC CONGESTIVE HEART FAILURE, UNSPECIFIED HEART FAILURE TYPE (HCC): ICD-10-CM

## 2018-01-01 DIAGNOSIS — D53.9 MACROCYTIC ANEMIA: ICD-10-CM

## 2018-01-01 DIAGNOSIS — E46 HYPOALBUMINEMIA DUE TO PROTEIN-CALORIE MALNUTRITION (HCC): ICD-10-CM

## 2018-01-01 DIAGNOSIS — M1A.9XX0 CHRONIC GOUT WITHOUT TOPHUS, UNSPECIFIED CAUSE, UNSPECIFIED SITE: ICD-10-CM

## 2018-01-01 DIAGNOSIS — E03.9 ACQUIRED HYPOTHYROIDISM: ICD-10-CM

## 2018-01-01 DIAGNOSIS — R77.9 ELEVATED SERUM PROTEIN LEVEL: ICD-10-CM

## 2018-01-01 DIAGNOSIS — I48.91 ATRIAL FIBRILLATION (HCC): ICD-10-CM

## 2018-01-01 DIAGNOSIS — Z99.2 ESRD ON HEMODIALYSIS (HCC): ICD-10-CM

## 2018-01-01 DIAGNOSIS — J90 PLEURAL EFFUSION: Primary | ICD-10-CM

## 2018-01-01 DIAGNOSIS — R53.1 WEAKNESS: Primary | ICD-10-CM

## 2018-01-01 DIAGNOSIS — D63.8 ANEMIA OF CHRONIC DISEASE: ICD-10-CM

## 2018-01-01 DIAGNOSIS — I48.20 CHRONIC ATRIAL FIBRILLATION (HCC): ICD-10-CM

## 2018-01-01 DIAGNOSIS — E88.09 HYPOALBUMINEMIA DUE TO PROTEIN-CALORIE MALNUTRITION (HCC): ICD-10-CM

## 2018-01-01 DIAGNOSIS — E66.01 MORBID OBESITY WITH BMI OF 50.0-59.9, ADULT (HCC): ICD-10-CM

## 2018-01-01 DIAGNOSIS — E78.5 DYSLIPIDEMIA: ICD-10-CM

## 2018-01-01 DIAGNOSIS — I89.0 LYMPHEDEMA: ICD-10-CM

## 2018-01-01 DIAGNOSIS — M1A.9XX0 CHRONIC GOUT WITHOUT TOPHUS, UNSPECIFIED CAUSE, UNSPECIFIED SITE: Primary | ICD-10-CM

## 2018-01-01 DIAGNOSIS — N18.6 ESRD ON HEMODIALYSIS (HCC): ICD-10-CM

## 2018-01-01 DIAGNOSIS — K27.9 PUD (PEPTIC ULCER DISEASE): ICD-10-CM

## 2018-01-01 DIAGNOSIS — K21.9 GASTROESOPHAGEAL REFLUX DISEASE, ESOPHAGITIS PRESENCE NOT SPECIFIED: ICD-10-CM

## 2018-01-01 DIAGNOSIS — Z79.01 LONG TERM (CURRENT) USE OF ANTICOAGULANTS: ICD-10-CM

## 2018-01-01 DIAGNOSIS — N18.6 ESRD (END STAGE RENAL DISEASE) (HCC): Primary | ICD-10-CM

## 2018-01-01 DIAGNOSIS — I50.23 ACUTE ON CHRONIC SYSTOLIC CONGESTIVE HEART FAILURE (HCC): ICD-10-CM

## 2018-01-01 DIAGNOSIS — R73.9 HYPERGLYCEMIA: ICD-10-CM

## 2018-01-01 DIAGNOSIS — N18.6 ESRD (END STAGE RENAL DISEASE) (HCC): ICD-10-CM

## 2018-01-01 DIAGNOSIS — R60.0 BILATERAL EDEMA OF LOWER EXTREMITY: ICD-10-CM

## 2018-01-01 DIAGNOSIS — E83.51 HYPOCALCEMIA: ICD-10-CM

## 2018-01-01 DIAGNOSIS — M10.00 IDIOPATHIC GOUT, UNSPECIFIED CHRONICITY, UNSPECIFIED SITE: ICD-10-CM

## 2018-01-01 DIAGNOSIS — D69.6 THROMBOCYTOPENIA (HCC): ICD-10-CM

## 2018-01-01 DIAGNOSIS — I10 HYPERTENSION, UNSPECIFIED TYPE: ICD-10-CM

## 2018-01-01 DIAGNOSIS — E03.8 OTHER SPECIFIED HYPOTHYROIDISM: ICD-10-CM

## 2018-01-01 LAB
ALBUMIN SERPL-MCNC: 2.8 G/DL (ref 3.5–4.8)
ALBUMIN SERPL-MCNC: 2.8 G/DL (ref 3.5–4.8)
ALBUMIN SERPL-MCNC: 3 G/DL (ref 3.5–4.8)
ALBUMIN SERPL-MCNC: 3.1 G/DL (ref 3.5–4.8)
ALBUMIN/GLOB SERPL: 0.6 {RATIO} (ref 1–2)
ALBUMIN/GLOB SERPL: 0.6 {RATIO} (ref 1–2)
ALBUMIN/GLOB SERPL: 0.7 {RATIO} (ref 1–2)
ALLENS TEST: POSITIVE
ALP LIVER SERPL-CCNC: 110 U/L (ref 55–142)
ALP LIVER SERPL-CCNC: 77 U/L (ref 55–142)
ALP LIVER SERPL-CCNC: 89 U/L (ref 55–142)
ALT SERPL-CCNC: 15 U/L (ref 14–54)
ALT SERPL-CCNC: 8 U/L (ref 14–54)
ALT SERPL-CCNC: <6 U/L (ref 14–54)
ANION GAP SERPL CALC-SCNC: 10 MMOL/L (ref 0–18)
ANION GAP SERPL CALC-SCNC: 11 MMOL/L (ref 0–18)
ANION GAP SERPL CALC-SCNC: 6 MMOL/L (ref 0–18)
ANION GAP SERPL CALC-SCNC: 6 MMOL/L (ref 0–18)
ANION GAP SERPL CALC-SCNC: 7 MMOL/L (ref 0–18)
ANION GAP SERPL CALC-SCNC: 8 MMOL/L (ref 0–18)
ANION GAP SERPL CALC-SCNC: 9 MMOL/L (ref 0–18)
ANION GAP SERPL CALC-SCNC: 9 MMOL/L (ref 0–18)
ARTERIAL BLD GAS O2 SATURATION: 87 % (ref 92–100)
ARTERIAL BLD GAS O2 SATURATION: 93 % (ref 92–100)
ARTERIAL BLD GAS O2 SATURATION: 93 % (ref 92–100)
ARTERIAL BLD GAS O2 SATURATION: 94 % (ref 92–100)
ARTERIAL BLD GAS O2 SATURATION: 95 % (ref 92–100)
ARTERIAL BLD GAS O2 SATURATION: 96 % (ref 92–100)
ARTERIAL BLD GAS O2 SATURATION: 97 % (ref 92–100)
ARTERIAL BLOOD GAS BASE EXCESS: -1.9
ARTERIAL BLOOD GAS BASE EXCESS: 0.1
ARTERIAL BLOOD GAS BASE EXCESS: 0.2
ARTERIAL BLOOD GAS BASE EXCESS: 0.3
ARTERIAL BLOOD GAS BASE EXCESS: 0.6
ARTERIAL BLOOD GAS BASE EXCESS: 1
ARTERIAL BLOOD GAS BASE EXCESS: 1.5
ARTERIAL BLOOD GAS BASE EXCESS: 1.5
ARTERIAL BLOOD GAS BASE EXCESS: 1.6
ARTERIAL BLOOD GAS BASE EXCESS: 1.7
ARTERIAL BLOOD GAS BASE EXCESS: 2.1
ARTERIAL BLOOD GAS BASE EXCESS: 2.1
ARTERIAL BLOOD GAS BASE EXCESS: 2.5
ARTERIAL BLOOD GAS HCO3: 25.9 MEQ/L (ref 22–26)
ARTERIAL BLOOD GAS HCO3: 26.6 MEQ/L (ref 22–26)
ARTERIAL BLOOD GAS HCO3: 28 MEQ/L (ref 22–26)
ARTERIAL BLOOD GAS HCO3: 28 MEQ/L (ref 22–26)
ARTERIAL BLOOD GAS HCO3: 28.1 MEQ/L (ref 22–26)
ARTERIAL BLOOD GAS HCO3: 28.5 MEQ/L (ref 22–26)
ARTERIAL BLOOD GAS HCO3: 28.6 MEQ/L (ref 22–26)
ARTERIAL BLOOD GAS HCO3: 28.6 MEQ/L (ref 22–26)
ARTERIAL BLOOD GAS HCO3: 29.1 MEQ/L (ref 22–26)
ARTERIAL BLOOD GAS HCO3: 29.4 MEQ/L (ref 22–26)
ARTERIAL BLOOD GAS HCO3: 29.5 MEQ/L (ref 22–26)
ARTERIAL BLOOD GAS HCO3: 29.5 MEQ/L (ref 22–26)
ARTERIAL BLOOD GAS HCO3: 29.7 MEQ/L (ref 22–26)
ARTERIAL BLOOD GAS HCO3: 30.2 MEQ/L (ref 22–26)
ARTERIAL BLOOD GAS HCO3: 30.7 MEQ/L (ref 22–26)
ARTERIAL BLOOD GAS PCO2: 49 MM HG (ref 35–45)
ARTERIAL BLOOD GAS PCO2: 51 MM HG (ref 35–45)
ARTERIAL BLOOD GAS PCO2: 54 MM HG (ref 35–45)
ARTERIAL BLOOD GAS PCO2: 58 MM HG (ref 35–45)
ARTERIAL BLOOD GAS PCO2: 60 MM HG (ref 35–45)
ARTERIAL BLOOD GAS PCO2: 61 MM HG (ref 35–45)
ARTERIAL BLOOD GAS PCO2: 61 MM HG (ref 35–45)
ARTERIAL BLOOD GAS PCO2: 66 MM HG (ref 35–45)
ARTERIAL BLOOD GAS PCO2: 66 MM HG (ref 35–45)
ARTERIAL BLOOD GAS PCO2: 67 MM HG (ref 35–45)
ARTERIAL BLOOD GAS PCO2: 73 MM HG (ref 35–45)
ARTERIAL BLOOD GAS PCO2: 78 MM HG (ref 35–45)
ARTERIAL BLOOD GAS PCO2: 81 MM HG (ref 35–45)
ARTERIAL BLOOD GAS PH: 7.19 (ref 7.35–7.45)
ARTERIAL BLOOD GAS PH: 7.2 (ref 7.35–7.45)
ARTERIAL BLOOD GAS PH: 7.22 (ref 7.35–7.45)
ARTERIAL BLOOD GAS PH: 7.25 (ref 7.35–7.45)
ARTERIAL BLOOD GAS PH: 7.27 (ref 7.35–7.45)
ARTERIAL BLOOD GAS PH: 7.28 (ref 7.35–7.45)
ARTERIAL BLOOD GAS PH: 7.29 (ref 7.35–7.45)
ARTERIAL BLOOD GAS PH: 7.29 (ref 7.35–7.45)
ARTERIAL BLOOD GAS PH: 7.3 (ref 7.35–7.45)
ARTERIAL BLOOD GAS PH: 7.31 (ref 7.35–7.45)
ARTERIAL BLOOD GAS PH: 7.33 (ref 7.35–7.45)
ARTERIAL BLOOD GAS PH: 7.34 (ref 7.35–7.45)
ARTERIAL BLOOD GAS PH: 7.38 (ref 7.35–7.45)
ARTERIAL BLOOD GAS PO2: 101 MM HG (ref 80–105)
ARTERIAL BLOOD GAS PO2: 112 MM HG (ref 80–105)
ARTERIAL BLOOD GAS PO2: 113 MM HG (ref 80–105)
ARTERIAL BLOOD GAS PO2: 116 MM HG (ref 80–105)
ARTERIAL BLOOD GAS PO2: 121 MM HG (ref 80–105)
ARTERIAL BLOOD GAS PO2: 132 MM HG (ref 80–105)
ARTERIAL BLOOD GAS PO2: 146 MM HG (ref 80–105)
ARTERIAL BLOOD GAS PO2: 182 MM HG (ref 80–105)
ARTERIAL BLOOD GAS PO2: 58 MM HG (ref 80–105)
ARTERIAL BLOOD GAS PO2: 77 MM HG (ref 80–105)
ARTERIAL BLOOD GAS PO2: 78 MM HG (ref 80–105)
ARTERIAL BLOOD GAS PO2: 80 MM HG (ref 80–105)
ARTERIAL BLOOD GAS PO2: 86 MM HG (ref 80–105)
ARTERIAL BLOOD GAS PO2: 92 MM HG (ref 80–105)
ARTERIAL BLOOD GAS PO2: 99 MM HG (ref 80–105)
AST SERPL-CCNC: 12 U/L (ref 15–41)
AST SERPL-CCNC: 14 U/L (ref 15–41)
AST SERPL-CCNC: 17 U/L (ref 15–41)
ATRIAL RATE: 76 BPM
BASOPHILS # BLD AUTO: 0.01 X10(3) UL (ref 0–0.1)
BASOPHILS # BLD AUTO: 0.02 X10(3) UL (ref 0–0.1)
BASOPHILS # BLD AUTO: 0.03 X10(3) UL (ref 0–0.1)
BASOPHILS NFR BLD AUTO: 0.2 %
BASOPHILS NFR BLD AUTO: 0.3 %
BASOPHILS NFR BLD AUTO: 0.3 %
BASOPHILS NFR BLD AUTO: 0.4 %
BASOPHILS NFR BLD AUTO: 0.5 %
BASOPHILS NFR BLD AUTO: 0.6 %
BASOPHILS NFR BLD AUTO: 0.6 %
BILIRUB SERPL-MCNC: 0.5 MG/DL (ref 0.1–2)
BILIRUB SERPL-MCNC: 0.9 MG/DL (ref 0.1–2)
BILIRUB SERPL-MCNC: 0.9 MG/DL (ref 0.1–2)
BUN BLD-MCNC: 21 MG/DL (ref 8–20)
BUN BLD-MCNC: 22 MG/DL (ref 8–20)
BUN BLD-MCNC: 22 MG/DL (ref 8–20)
BUN BLD-MCNC: 27 MG/DL (ref 8–20)
BUN BLD-MCNC: 29 MG/DL (ref 8–20)
BUN BLD-MCNC: 32 MG/DL (ref 8–20)
BUN BLD-MCNC: 32 MG/DL (ref 8–20)
BUN BLD-MCNC: 33 MG/DL (ref 8–20)
BUN BLD-MCNC: 40 MG/DL (ref 8–20)
BUN BLD-MCNC: 43 MG/DL (ref 8–20)
BUN BLD-MCNC: 44 MG/DL (ref 8–20)
BUN BLD-MCNC: 48 MG/DL (ref 8–20)
BUN BLD-MCNC: 51 MG/DL (ref 8–20)
BUN/CREAT SERPL: 5 (ref 10–20)
BUN/CREAT SERPL: 5.1 (ref 10–20)
BUN/CREAT SERPL: 5.7 (ref 10–20)
BUN/CREAT SERPL: 5.9 (ref 10–20)
BUN/CREAT SERPL: 6.3 (ref 10–20)
BUN/CREAT SERPL: 6.5 (ref 10–20)
BUN/CREAT SERPL: 6.5 (ref 10–20)
BUN/CREAT SERPL: 6.7 (ref 10–20)
CALCIUM BLD-MCNC: 7.8 MG/DL (ref 8.3–10.3)
CALCIUM BLD-MCNC: 7.9 MG/DL (ref 8.3–10.3)
CALCIUM BLD-MCNC: 8.3 MG/DL (ref 8.3–10.3)
CALCIUM BLD-MCNC: 8.3 MG/DL (ref 8.3–10.3)
CALCIUM BLD-MCNC: 8.4 MG/DL (ref 8.3–10.3)
CALCIUM BLD-MCNC: 8.4 MG/DL (ref 8.3–10.3)
CALCIUM BLD-MCNC: 8.6 MG/DL (ref 8.3–10.3)
CALCIUM BLD-MCNC: 8.7 MG/DL (ref 8.3–10.3)
CALCIUM BLD-MCNC: 8.8 MG/DL (ref 8.3–10.3)
CALCIUM BLD-MCNC: 8.9 MG/DL (ref 8.3–10.3)
CALCULATED O2 SATURATION: 86 % (ref 92–100)
CALCULATED O2 SATURATION: 93 % (ref 92–100)
CALCULATED O2 SATURATION: 94 % (ref 92–100)
CALCULATED O2 SATURATION: 94 % (ref 92–100)
CALCULATED O2 SATURATION: 95 % (ref 92–100)
CALCULATED O2 SATURATION: 95 % (ref 92–100)
CALCULATED O2 SATURATION: 97 % (ref 92–100)
CALCULATED O2 SATURATION: 98 % (ref 92–100)
CALCULATED O2 SATURATION: 99 % (ref 92–100)
CARBOXYHEMOGLOBIN: 1.6 % SAT (ref 0–3)
CARBOXYHEMOGLOBIN: 1.7 % SAT (ref 0–3)
CARBOXYHEMOGLOBIN: 1.8 % SAT (ref 0–3)
CARBOXYHEMOGLOBIN: 1.9 % SAT (ref 0–3)
CARBOXYHEMOGLOBIN: 2 % SAT (ref 0–3)
CARBOXYHEMOGLOBIN: 2.2 % SAT (ref 0–3)
CARBOXYHEMOGLOBIN: 2.4 % SAT (ref 0–3)
CHLORIDE SERPL-SCNC: 100 MMOL/L (ref 101–111)
CHLORIDE SERPL-SCNC: 101 MMOL/L (ref 101–111)
CHLORIDE SERPL-SCNC: 101 MMOL/L (ref 101–111)
CHLORIDE SERPL-SCNC: 102 MMOL/L (ref 101–111)
CHLORIDE SERPL-SCNC: 96 MMOL/L (ref 101–111)
CHLORIDE SERPL-SCNC: 98 MMOL/L (ref 101–111)
CHLORIDE SERPL-SCNC: 99 MMOL/L (ref 101–111)
CO2 SERPL-SCNC: 26 MMOL/L (ref 22–32)
CO2 SERPL-SCNC: 26 MMOL/L (ref 22–32)
CO2 SERPL-SCNC: 27 MMOL/L (ref 22–32)
CO2 SERPL-SCNC: 28 MMOL/L (ref 22–32)
CO2 SERPL-SCNC: 29 MMOL/L (ref 22–32)
CO2 SERPL-SCNC: 30 MMOL/L (ref 22–32)
CORTIS SERPL-MCNC: 13 UG/DL
CREAT BLD-MCNC: 4.08 MG/DL (ref 0.55–1.02)
CREAT BLD-MCNC: 4.3 MG/DL (ref 0.55–1.02)
CREAT BLD-MCNC: 4.44 MG/DL (ref 0.55–1.02)
CREAT BLD-MCNC: 4.88 MG/DL (ref 0.55–1.02)
CREAT BLD-MCNC: 5.29 MG/DL (ref 0.55–1.02)
CREAT BLD-MCNC: 5.36 MG/DL (ref 0.55–1.02)
CREAT BLD-MCNC: 5.62 MG/DL (ref 0.55–1.02)
CREAT BLD-MCNC: 5.63 MG/DL (ref 0.55–1.02)
CREAT BLD-MCNC: 6.29 MG/DL (ref 0.55–1.02)
CREAT BLD-MCNC: 6.32 MG/DL (ref 0.55–1.02)
CREAT BLD-MCNC: 6.81 MG/DL (ref 0.55–1.02)
CREAT BLD-MCNC: 7.41 MG/DL (ref 0.55–1.02)
CREAT BLD-MCNC: 7.58 MG/DL (ref 0.55–1.02)
EOSINOPHIL # BLD AUTO: 0.05 X10(3) UL (ref 0–0.3)
EOSINOPHIL # BLD AUTO: 0.08 X10(3) UL (ref 0–0.3)
EOSINOPHIL # BLD AUTO: 0.11 X10(3) UL (ref 0–0.3)
EOSINOPHIL # BLD AUTO: 0.12 X10(3) UL (ref 0–0.3)
EOSINOPHIL # BLD AUTO: 0.14 X10(3) UL (ref 0–0.3)
EOSINOPHIL # BLD AUTO: 0.15 X10(3) UL (ref 0–0.3)
EOSINOPHIL # BLD AUTO: 0.16 X10(3) UL (ref 0–0.3)
EOSINOPHIL # BLD AUTO: 0.16 X10(3) UL (ref 0–0.3)
EOSINOPHIL # BLD AUTO: 0.17 X10(3) UL (ref 0–0.3)
EOSINOPHIL NFR BLD AUTO: 0.6 %
EOSINOPHIL NFR BLD AUTO: 1.3 %
EOSINOPHIL NFR BLD AUTO: 2.1 %
EOSINOPHIL NFR BLD AUTO: 2.3 %
EOSINOPHIL NFR BLD AUTO: 2.6 %
EOSINOPHIL NFR BLD AUTO: 2.6 %
EOSINOPHIL NFR BLD AUTO: 2.8 %
EOSINOPHIL NFR BLD AUTO: 3.1 %
EOSINOPHIL NFR BLD AUTO: 3.4 %
ERYTHROCYTE [DISTWIDTH] IN BLOOD BY AUTOMATED COUNT: 14.3 % (ref 11.5–16)
ERYTHROCYTE [DISTWIDTH] IN BLOOD BY AUTOMATED COUNT: 14.5 % (ref 11.5–16)
ERYTHROCYTE [DISTWIDTH] IN BLOOD BY AUTOMATED COUNT: 14.5 % (ref 11.5–16)
ERYTHROCYTE [DISTWIDTH] IN BLOOD BY AUTOMATED COUNT: 14.7 % (ref 11.5–16)
ERYTHROCYTE [DISTWIDTH] IN BLOOD BY AUTOMATED COUNT: 14.7 % (ref 11.5–16)
ERYTHROCYTE [DISTWIDTH] IN BLOOD BY AUTOMATED COUNT: 14.8 % (ref 11.5–16)
ERYTHROCYTE [DISTWIDTH] IN BLOOD BY AUTOMATED COUNT: 15 % (ref 11.5–16)
ERYTHROCYTE [DISTWIDTH] IN BLOOD BY AUTOMATED COUNT: 15.1 % (ref 11.5–16)
ERYTHROCYTE [DISTWIDTH] IN BLOOD BY AUTOMATED COUNT: 15.1 % (ref 11.5–16)
ERYTHROCYTE [DISTWIDTH] IN BLOOD BY AUTOMATED COUNT: 15.2 % (ref 11.5–16)
ERYTHROCYTE [DISTWIDTH] IN BLOOD BY AUTOMATED COUNT: 15.3 % (ref 11.5–16)
ERYTHROCYTE [DISTWIDTH] IN BLOOD BY AUTOMATED COUNT: 15.6 % (ref 11.5–16)
ERYTHROCYTE [DISTWIDTH] IN BLOOD BY AUTOMATED COUNT: 15.9 % (ref 11.5–16)
ERYTHROCYTE [DISTWIDTH] IN BLOOD BY AUTOMATED COUNT: 16.2 % (ref 11.5–16)
EXPIRATORY PRESSURE: 5 CM H2O
EXPIRATORY PRESSURE: 5 CM H2O
FIO2: 28 %
FIO2: 30 %
FIO2: 30 %
FIO2: 32 %
FIO2: 35 %
FIO2: 40 %
FIO2: 40 %
FIO2: 50 %
GLOBULIN PLAS-MCNC: 4.4 G/DL (ref 2.5–4)
GLOBULIN PLAS-MCNC: 4.4 G/DL (ref 2.5–4)
GLOBULIN PLAS-MCNC: 5.2 G/DL (ref 2.5–4)
GLUCOSE BLD-MCNC: 102 MG/DL (ref 70–99)
GLUCOSE BLD-MCNC: 108 MG/DL (ref 65–99)
GLUCOSE BLD-MCNC: 110 MG/DL (ref 65–99)
GLUCOSE BLD-MCNC: 111 MG/DL (ref 70–99)
GLUCOSE BLD-MCNC: 112 MG/DL (ref 70–99)
GLUCOSE BLD-MCNC: 112 MG/DL (ref 70–99)
GLUCOSE BLD-MCNC: 113 MG/DL (ref 65–99)
GLUCOSE BLD-MCNC: 115 MG/DL (ref 70–99)
GLUCOSE BLD-MCNC: 117 MG/DL (ref 65–99)
GLUCOSE BLD-MCNC: 122 MG/DL (ref 65–99)
GLUCOSE BLD-MCNC: 125 MG/DL (ref 65–99)
GLUCOSE BLD-MCNC: 136 MG/DL (ref 65–99)
GLUCOSE BLD-MCNC: 139 MG/DL (ref 65–99)
GLUCOSE BLD-MCNC: 142 MG/DL (ref 65–99)
GLUCOSE BLD-MCNC: 144 MG/DL (ref 65–99)
GLUCOSE BLD-MCNC: 166 MG/DL (ref 65–99)
GLUCOSE BLD-MCNC: 73 MG/DL (ref 70–99)
GLUCOSE BLD-MCNC: 80 MG/DL (ref 70–99)
GLUCOSE BLD-MCNC: 81 MG/DL (ref 70–99)
GLUCOSE BLD-MCNC: 82 MG/DL (ref 70–99)
GLUCOSE BLD-MCNC: 82 MG/DL (ref 70–99)
GLUCOSE BLD-MCNC: 85 MG/DL (ref 65–99)
GLUCOSE BLD-MCNC: 85 MG/DL (ref 70–99)
GLUCOSE BLD-MCNC: 88 MG/DL (ref 65–99)
GLUCOSE BLD-MCNC: 94 MG/DL (ref 70–99)
GLUCOSE BLD-MCNC: 97 MG/DL (ref 70–99)
HAV IGM SER QL: 1.7 MG/DL (ref 1.8–2.5)
HAV IGM SER QL: 1.9 MG/DL (ref 1.8–2.5)
HAV IGM SER QL: 2 MG/DL (ref 1.8–2.5)
HAV IGM SER QL: 2 MG/DL (ref 1.8–2.5)
HAV IGM SER QL: 2.1 MG/DL (ref 1.8–2.5)
HAV IGM SER QL: 2.2 MG/DL (ref 1.8–2.5)
HAV IGM SER QL: 2.2 MG/DL (ref 1.8–2.5)
HBV CORE AB SERPL QL IA: NONREACTIVE
HBV SURFACE AB SER QL: NONREACTIVE
HBV SURFACE AB SERPL IA-ACNC: <3.1 MIU/ML
HBV SURFACE AG SER-ACNC: <0.1 [IU]/L
HBV SURFACE AG SERPL QL IA: NONREACTIVE
HCT VFR BLD AUTO: 28.5 % (ref 34–50)
HCT VFR BLD AUTO: 28.6 % (ref 34–50)
HCT VFR BLD AUTO: 29.3 % (ref 34–50)
HCT VFR BLD AUTO: 29.7 % (ref 34–50)
HCT VFR BLD AUTO: 30.2 % (ref 34–50)
HCT VFR BLD AUTO: 30.6 % (ref 34–50)
HCT VFR BLD AUTO: 31.1 % (ref 34–50)
HCT VFR BLD AUTO: 31.3 % (ref 34–50)
HCT VFR BLD AUTO: 31.4 % (ref 34–50)
HCT VFR BLD AUTO: 31.5 % (ref 34–50)
HCT VFR BLD AUTO: 31.6 % (ref 34–50)
HCT VFR BLD AUTO: 32.5 % (ref 34–50)
HCT VFR BLD AUTO: 33 % (ref 34–50)
HCT VFR BLD AUTO: 35.4 % (ref 34–50)
HGB BLD-MCNC: 10.1 G/DL (ref 12–16)
HGB BLD-MCNC: 10.7 G/DL (ref 12–16)
HGB BLD-MCNC: 8.9 G/DL (ref 12–16)
HGB BLD-MCNC: 9 G/DL (ref 12–16)
HGB BLD-MCNC: 9 G/DL (ref 12–16)
HGB BLD-MCNC: 9.2 G/DL (ref 12–16)
HGB BLD-MCNC: 9.2 G/DL (ref 12–16)
HGB BLD-MCNC: 9.5 G/DL (ref 12–16)
HGB BLD-MCNC: 9.7 G/DL (ref 12–16)
HGB BLD-MCNC: 9.7 G/DL (ref 12–16)
HGB BLD-MCNC: 9.8 G/DL (ref 12–16)
HGB BLD-MCNC: 9.9 G/DL (ref 12–16)
IMMATURE GRANULOCYTE COUNT: 0.01 X10(3) UL (ref 0–1)
IMMATURE GRANULOCYTE COUNT: 0.02 X10(3) UL (ref 0–1)
IMMATURE GRANULOCYTE COUNT: 0.04 X10(3) UL (ref 0–1)
IMMATURE GRANULOCYTE COUNT: 0.04 X10(3) UL (ref 0–1)
IMMATURE GRANULOCYTE RATIO %: 0.2 %
IMMATURE GRANULOCYTE RATIO %: 0.2 %
IMMATURE GRANULOCYTE RATIO %: 0.3 %
IMMATURE GRANULOCYTE RATIO %: 0.5 %
IMMATURE GRANULOCYTE RATIO %: 0.7 %
INR BLD: 1.71 (ref 0.9–1.1)
INR BLD: 1.73 (ref 0.9–1.1)
INR BLD: 1.9 (ref 0.9–1.1)
INR BLD: 1.95 (ref 0.9–1.1)
INR BLD: 1.96 (ref 0.9–1.1)
INR BLD: 1.99 (ref 0.9–1.1)
INR BLD: 2.04 (ref 0.9–1.1)
INR BLD: 2.08 (ref 0.9–1.1)
INR BLD: 2.24 (ref 0.9–1.1)
INR BLD: 2.72 (ref 0.9–1.1)
INR BLD: 2.88 (ref 0.9–1.1)
INR BLD: 3.08 (ref 0.92–1.08)
INR BLD: 4.12 (ref 0.9–1.1)
INR BLD: 4.13 (ref 0.9–1.1)
INR BLD: 4.13 (ref 0.9–1.1)
INR BLD: 4.78 (ref 0.9–1.1)
INR BLD: 4.96 (ref 0.9–1.1)
INR BLD: 5.17 (ref 0.9–1.1)
INR BLD: 5.23 (ref 0.9–1.1)
IONIZED CALCIUM: 1.04 MMOL/L (ref 1.12–1.32)
L/M: 2 L/MIN
L/M: 3 L/MIN
L/M: 4 L/MIN
LACTIC ACID ARTERIAL: <1.3 MMOL/L (ref 0.5–2)
LACTIC ACID ARTERIAL: <1.3 MMOL/L (ref 0.5–2)
LYMPHOCYTES # BLD AUTO: 0.32 X10(3) UL (ref 0.9–4)
LYMPHOCYTES # BLD AUTO: 0.34 X10(3) UL (ref 0.9–4)
LYMPHOCYTES # BLD AUTO: 0.35 X10(3) UL (ref 0.9–4)
LYMPHOCYTES # BLD AUTO: 0.36 X10(3) UL (ref 0.9–4)
LYMPHOCYTES # BLD AUTO: 0.38 X10(3) UL (ref 0.9–4)
LYMPHOCYTES # BLD AUTO: 0.38 X10(3) UL (ref 0.9–4)
LYMPHOCYTES # BLD AUTO: 0.39 X10(3) UL (ref 0.9–4)
LYMPHOCYTES # BLD AUTO: 0.39 X10(3) UL (ref 0.9–4)
LYMPHOCYTES # BLD AUTO: 0.43 X10(3) UL (ref 0.9–4)
LYMPHOCYTES NFR BLD AUTO: 10.9 %
LYMPHOCYTES NFR BLD AUTO: 3.9 %
LYMPHOCYTES NFR BLD AUTO: 4.7 %
LYMPHOCYTES NFR BLD AUTO: 5.4 %
LYMPHOCYTES NFR BLD AUTO: 5.9 %
LYMPHOCYTES NFR BLD AUTO: 6.1 %
LYMPHOCYTES NFR BLD AUTO: 7.2 %
LYMPHOCYTES NFR BLD AUTO: 7.6 %
LYMPHOCYTES NFR BLD AUTO: 9 %
M PROTEIN MFR SERPL ELPH: 7.2 G/DL (ref 6.1–8.3)
M PROTEIN MFR SERPL ELPH: 7.4 G/DL (ref 6.1–8.3)
M PROTEIN MFR SERPL ELPH: 8.3 G/DL (ref 6.1–8.3)
MCH RBC QN AUTO: 37.5 PG (ref 27–33.2)
MCH RBC QN AUTO: 37.8 PG (ref 27–33.2)
MCH RBC QN AUTO: 37.9 PG (ref 27–33.2)
MCH RBC QN AUTO: 38 PG (ref 27–33.2)
MCH RBC QN AUTO: 38 PG (ref 27–33.2)
MCH RBC QN AUTO: 38.1 PG (ref 27–33.2)
MCH RBC QN AUTO: 38.1 PG (ref 27–33.2)
MCH RBC QN AUTO: 38.2 PG (ref 27–33.2)
MCH RBC QN AUTO: 38.3 PG (ref 27–33.2)
MCH RBC QN AUTO: 38.4 PG (ref 27–33.2)
MCH RBC QN AUTO: 38.5 PG (ref 27–33.2)
MCH RBC QN AUTO: 38.8 PG (ref 27–33.2)
MCHC RBC AUTO-ENTMCNC: 29.7 G/DL (ref 31–37)
MCHC RBC AUTO-ENTMCNC: 30.2 G/DL (ref 31–37)
MCHC RBC AUTO-ENTMCNC: 30.3 G/DL (ref 31–37)
MCHC RBC AUTO-ENTMCNC: 30.4 G/DL (ref 31–37)
MCHC RBC AUTO-ENTMCNC: 30.5 G/DL (ref 31–37)
MCHC RBC AUTO-ENTMCNC: 31 G/DL (ref 31–37)
MCHC RBC AUTO-ENTMCNC: 31.1 G/DL (ref 31–37)
MCHC RBC AUTO-ENTMCNC: 31.2 G/DL (ref 31–37)
MCHC RBC AUTO-ENTMCNC: 31.4 G/DL (ref 31–37)
MCHC RBC AUTO-ENTMCNC: 31.4 G/DL (ref 31–37)
MCHC RBC AUTO-ENTMCNC: 31.5 G/DL (ref 31–37)
MCHC RBC AUTO-ENTMCNC: 31.7 G/DL (ref 31–37)
MCV RBC AUTO: 120.7 FL (ref 81–100)
MCV RBC AUTO: 121.1 FL (ref 81–100)
MCV RBC AUTO: 121.4 FL (ref 81–100)
MCV RBC AUTO: 122.4 FL (ref 81–100)
MCV RBC AUTO: 122.6 FL (ref 81–100)
MCV RBC AUTO: 122.7 FL (ref 81–100)
MCV RBC AUTO: 122.8 FL (ref 81–100)
MCV RBC AUTO: 123.5 FL (ref 81–100)
MCV RBC AUTO: 123.7 FL (ref 81–100)
MCV RBC AUTO: 123.9 FL (ref 81–100)
MCV RBC AUTO: 124.3 FL (ref 81–100)
MCV RBC AUTO: 124.8 FL (ref 81–100)
MCV RBC AUTO: 126.9 FL (ref 81–100)
MCV RBC AUTO: 128.4 FL (ref 81–100)
METHEMOGLOBIN: 0.3 % SAT (ref 0.4–1.5)
METHEMOGLOBIN: 0.4 % SAT (ref 0.4–1.5)
METHEMOGLOBIN: 0.5 % SAT (ref 0.4–1.5)
MONOCYTES # BLD AUTO: 0.33 X10(3) UL (ref 0.1–1)
MONOCYTES # BLD AUTO: 0.34 X10(3) UL (ref 0.1–1)
MONOCYTES # BLD AUTO: 0.37 X10(3) UL (ref 0.1–1)
MONOCYTES # BLD AUTO: 0.37 X10(3) UL (ref 0.1–1)
MONOCYTES # BLD AUTO: 0.4 X10(3) UL (ref 0.1–1)
MONOCYTES # BLD AUTO: 0.5 X10(3) UL (ref 0.1–1)
MONOCYTES # BLD AUTO: 0.55 X10(3) UL (ref 0.1–1)
MONOCYTES # BLD AUTO: 0.6 X10(3) UL (ref 0.1–1)
MONOCYTES # BLD AUTO: 0.6 X10(3) UL (ref 0.1–1)
MONOCYTES NFR BLD AUTO: 4.5 %
MONOCYTES NFR BLD AUTO: 6.3 %
MONOCYTES NFR BLD AUTO: 7.5 %
MONOCYTES NFR BLD AUTO: 7.8 %
MONOCYTES NFR BLD AUTO: 8.3 %
MONOCYTES NFR BLD AUTO: 8.4 %
MONOCYTES NFR BLD AUTO: 8.7 %
MONOCYTES NFR BLD AUTO: 9.2 %
MONOCYTES NFR BLD AUTO: 9.7 %
NEUTROPHIL ABS PRELIM: 3.04 X10 (3) UL (ref 1.3–6.7)
NEUTROPHIL ABS PRELIM: 3.06 X10 (3) UL (ref 1.3–6.7)
NEUTROPHIL ABS PRELIM: 3.79 X10 (3) UL (ref 1.3–6.7)
NEUTROPHIL ABS PRELIM: 4.33 X10 (3) UL (ref 1.3–6.7)
NEUTROPHIL ABS PRELIM: 5.01 X10 (3) UL (ref 1.3–6.7)
NEUTROPHIL ABS PRELIM: 5.44 X10 (3) UL (ref 1.3–6.7)
NEUTROPHIL ABS PRELIM: 5.5 X10 (3) UL (ref 1.3–6.7)
NEUTROPHIL ABS PRELIM: 6.79 X10 (3) UL (ref 1.3–6.7)
NEUTROPHIL ABS PRELIM: 7.53 X10 (3) UL (ref 1.3–6.7)
NEUTROPHILS # BLD AUTO: 3.04 X10(3) UL (ref 1.3–6.7)
NEUTROPHILS # BLD AUTO: 3.06 X10(3) UL (ref 1.3–6.7)
NEUTROPHILS # BLD AUTO: 3.79 X10(3) UL (ref 1.3–6.7)
NEUTROPHILS # BLD AUTO: 4.33 X10(3) UL (ref 1.3–6.7)
NEUTROPHILS # BLD AUTO: 5.01 X10(3) UL (ref 1.3–6.7)
NEUTROPHILS # BLD AUTO: 5.44 X10(3) UL (ref 1.3–6.7)
NEUTROPHILS # BLD AUTO: 5.5 X10(3) UL (ref 1.3–6.7)
NEUTROPHILS # BLD AUTO: 6.79 X10(3) UL (ref 1.3–6.7)
NEUTROPHILS # BLD AUTO: 7.53 X10(3) UL (ref 1.3–6.7)
NEUTROPHILS NFR BLD AUTO: 77.3 %
NEUTROPHILS NFR BLD AUTO: 78.4 %
NEUTROPHILS NFR BLD AUTO: 79.7 %
NEUTROPHILS NFR BLD AUTO: 80.4 %
NEUTROPHILS NFR BLD AUTO: 80.8 %
NEUTROPHILS NFR BLD AUTO: 82.9 %
NEUTROPHILS NFR BLD AUTO: 84.8 %
NEUTROPHILS NFR BLD AUTO: 86.2 %
NEUTROPHILS NFR BLD AUTO: 90.6 %
OSMOLALITY SERPL CALC.SUM OF ELEC: 280 MOSM/KG (ref 275–295)
OSMOLALITY SERPL CALC.SUM OF ELEC: 280 MOSM/KG (ref 275–295)
OSMOLALITY SERPL CALC.SUM OF ELEC: 286 MOSM/KG (ref 275–295)
OSMOLALITY SERPL CALC.SUM OF ELEC: 289 MOSM/KG (ref 275–295)
OSMOLALITY SERPL CALC.SUM OF ELEC: 290 MOSM/KG (ref 275–295)
OSMOLALITY SERPL CALC.SUM OF ELEC: 294 MOSM/KG (ref 275–295)
OSMOLALITY SERPL CALC.SUM OF ELEC: 295 MOSM/KG (ref 275–295)
OSMOLALITY SERPL CALC.SUM OF ELEC: 299 MOSM/KG (ref 275–295)
P/F RATIO: 418.7 MMHG
PATIENT TEMPERATURE: 96.9 F
PATIENT TEMPERATURE: 97.7 F
PATIENT TEMPERATURE: 97.8 F
PATIENT TEMPERATURE: 97.9 F
PATIENT TEMPERATURE: 98 F
PATIENT TEMPERATURE: 98 F
PATIENT TEMPERATURE: 98.2 F
PATIENT TEMPERATURE: 98.4 F
PATIENT TEMPERATURE: 98.6 F
PEEP: 10 CM H2O
PEEP: 5 CM H2O
PHOSPHATE SERPL-MCNC: 1.9 MG/DL (ref 2.5–4.9)
PHOSPHATE SERPL-MCNC: 2.1 MG/DL (ref 2.5–4.9)
PHOSPHATE SERPL-MCNC: 2.6 MG/DL (ref 2.5–4.9)
PHOSPHATE SERPL-MCNC: 2.7 MG/DL (ref 2.5–4.9)
PHOSPHATE SERPL-MCNC: 2.9 MG/DL (ref 2.5–4.9)
PLATELET # BLD AUTO: 69 10(3)UL (ref 150–450)
PLATELET # BLD AUTO: 73 10(3)UL (ref 150–450)
PLATELET # BLD AUTO: 76 10(3)UL (ref 150–450)
PLATELET # BLD AUTO: 78 10(3)UL (ref 150–450)
PLATELET # BLD AUTO: 80 10(3)UL (ref 150–450)
PLATELET # BLD AUTO: 81 10(3)UL (ref 150–450)
PLATELET # BLD AUTO: 86 10(3)UL (ref 150–450)
PLATELET # BLD AUTO: 86 10(3)UL (ref 150–450)
PLATELET # BLD AUTO: 87 10(3)UL (ref 150–450)
PLATELET # BLD AUTO: 87 10(3)UL (ref 150–450)
PLATELET # BLD AUTO: 89 10(3)UL (ref 150–450)
PLATELET # BLD AUTO: 91 10(3)UL (ref 150–450)
PLATELET # BLD AUTO: 93 10(3)UL (ref 150–450)
PLATELET # BLD AUTO: 93 10(3)UL (ref 150–450)
PLATELET MORPHOLOGY: NORMAL
PLATELET MORPHOLOGY: NORMAL
POTASSIUM BLOOD GAS: 4.2 MMOL/L (ref 3.6–5.1)
POTASSIUM SERPL-SCNC: 3.4 MMOL/L (ref 3.6–5.1)
POTASSIUM SERPL-SCNC: 3.5 MMOL/L (ref 3.6–5.1)
POTASSIUM SERPL-SCNC: 3.5 MMOL/L (ref 3.6–5.1)
POTASSIUM SERPL-SCNC: 3.7 MMOL/L (ref 3.6–5.1)
POTASSIUM SERPL-SCNC: 3.8 MMOL/L (ref 3.6–5.1)
POTASSIUM SERPL-SCNC: 3.9 MMOL/L (ref 3.6–5.1)
POTASSIUM SERPL-SCNC: 4 MMOL/L (ref 3.6–5.1)
POTASSIUM SERPL-SCNC: 4 MMOL/L (ref 3.6–5.1)
POTASSIUM SERPL-SCNC: 4.3 MMOL/L (ref 3.6–5.1)
POTASSIUM SERPL-SCNC: 4.6 MMOL/L (ref 3.6–5.1)
POTASSIUM SERPL-SCNC: 4.7 MMOL/L (ref 3.6–5.1)
POTASSIUM SERPL-SCNC: 5 MMOL/L (ref 3.6–5.1)
POTASSIUM SERPL-SCNC: 5.3 MMOL/L (ref 3.6–5.1)
PRO-BETA NATRIURETIC PEPTIDE: ABNORMAL PG/ML (ref ?–125)
PSA SERPL DL<=0.01 NG/ML-MCNC: 20.7 SECONDS (ref 12.4–14.7)
PSA SERPL DL<=0.01 NG/ML-MCNC: 20.9 SECONDS (ref 12.4–14.7)
PSA SERPL DL<=0.01 NG/ML-MCNC: 22.5 SECONDS (ref 12.4–14.7)
PSA SERPL DL<=0.01 NG/ML-MCNC: 22.9 SECONDS (ref 12.4–14.7)
PSA SERPL DL<=0.01 NG/ML-MCNC: 23 SECONDS (ref 12.4–14.7)
PSA SERPL DL<=0.01 NG/ML-MCNC: 23.3 SECONDS (ref 12.4–14.7)
PSA SERPL DL<=0.01 NG/ML-MCNC: 23.7 SECONDS (ref 12.4–14.7)
PSA SERPL DL<=0.01 NG/ML-MCNC: 24.1 SECONDS (ref 12.4–14.7)
PSA SERPL DL<=0.01 NG/ML-MCNC: 25.5 SECONDS (ref 12.4–14.7)
PSA SERPL DL<=0.01 NG/ML-MCNC: 29.7 SECONDS (ref 12.4–14.7)
PSA SERPL DL<=0.01 NG/ML-MCNC: 31.1 SECONDS (ref 12.4–14.7)
PSA SERPL DL<=0.01 NG/ML-MCNC: 32 SECONDS (ref 12.5–14.1)
PSA SERPL DL<=0.01 NG/ML-MCNC: 41.1 SECONDS (ref 12.4–14.7)
PSA SERPL DL<=0.01 NG/ML-MCNC: 41.2 SECONDS (ref 12.4–14.7)
PSA SERPL DL<=0.01 NG/ML-MCNC: 41.2 SECONDS (ref 12.4–14.7)
PSA SERPL DL<=0.01 NG/ML-MCNC: 46.2 SECONDS (ref 12.4–14.7)
PSA SERPL DL<=0.01 NG/ML-MCNC: 47.5 SECONDS (ref 12.4–14.7)
PSA SERPL DL<=0.01 NG/ML-MCNC: 49.1 SECONDS (ref 12.4–14.7)
PSA SERPL DL<=0.01 NG/ML-MCNC: 49.5 SECONDS (ref 12.4–14.7)
Q-T INTERVAL: 404 MS
QRS DURATION: 162 MS
QTC CALCULATION (BEZET): 499 MS
R AXIS: 265 DEGREES
RBC # BLD AUTO: 2.32 X10(6)UL (ref 3.8–5.1)
RBC # BLD AUTO: 2.37 X10(6)UL (ref 3.8–5.1)
RBC # BLD AUTO: 2.38 X10(6)UL (ref 3.8–5.1)
RBC # BLD AUTO: 2.42 X10(6)UL (ref 3.8–5.1)
RBC # BLD AUTO: 2.43 X10(6)UL (ref 3.8–5.1)
RBC # BLD AUTO: 2.52 X10(6)UL (ref 3.8–5.1)
RBC # BLD AUTO: 2.53 X10(6)UL (ref 3.8–5.1)
RBC # BLD AUTO: 2.54 X10(6)UL (ref 3.8–5.1)
RBC # BLD AUTO: 2.55 X10(6)UL (ref 3.8–5.1)
RBC # BLD AUTO: 2.55 X10(6)UL (ref 3.8–5.1)
RBC # BLD AUTO: 2.56 X10(6)UL (ref 3.8–5.1)
RBC # BLD AUTO: 2.57 X10(6)UL (ref 3.8–5.1)
RBC # BLD AUTO: 2.65 X10(6)UL (ref 3.8–5.1)
RBC # BLD AUTO: 2.79 X10(6)UL (ref 3.8–5.1)
RED CELL DISTRIBUTION WIDTH-SD: 65.4 FL (ref 35.1–46.3)
RED CELL DISTRIBUTION WIDTH-SD: 66.7 FL (ref 35.1–46.3)
RED CELL DISTRIBUTION WIDTH-SD: 66.9 FL (ref 35.1–46.3)
RED CELL DISTRIBUTION WIDTH-SD: 67.2 FL (ref 35.1–46.3)
RED CELL DISTRIBUTION WIDTH-SD: 67.2 FL (ref 35.1–46.3)
RED CELL DISTRIBUTION WIDTH-SD: 67.5 FL (ref 35.1–46.3)
RED CELL DISTRIBUTION WIDTH-SD: 67.5 FL (ref 35.1–46.3)
RED CELL DISTRIBUTION WIDTH-SD: 67.8 FL (ref 35.1–46.3)
RED CELL DISTRIBUTION WIDTH-SD: 68.7 FL (ref 35.1–46.3)
RED CELL DISTRIBUTION WIDTH-SD: 69.4 FL (ref 35.1–46.3)
RED CELL DISTRIBUTION WIDTH-SD: 69.5 FL (ref 35.1–46.3)
RED CELL DISTRIBUTION WIDTH-SD: 70.7 FL (ref 35.1–46.3)
RED CELL DISTRIBUTION WIDTH-SD: 75.8 FL (ref 35.1–46.3)
RED CELL DISTRIBUTION WIDTH-SD: 76.2 FL (ref 35.1–46.3)
RETIC ABS CALC AUTO: 112.3 X10(3) UL (ref 22.5–147.5)
RETIC IRF CALC: 0.31 RATIO (ref 0.09–0.3)
RETIC%: 4.2 % (ref 0.5–2.5)
RETICULOCYTE HEMOGLOBIN EQUIVALENT: 39.4 PG (ref 28.2–36.3)
SODIUM BLOOD GAS: 135 MMOL/L (ref 136–144)
SODIUM SERPL-SCNC: 131 MMOL/L (ref 136–144)
SODIUM SERPL-SCNC: 132 MMOL/L (ref 136–144)
SODIUM SERPL-SCNC: 133 MMOL/L (ref 136–144)
SODIUM SERPL-SCNC: 134 MMOL/L (ref 136–144)
SODIUM SERPL-SCNC: 136 MMOL/L (ref 136–144)
SODIUM SERPL-SCNC: 136 MMOL/L (ref 136–144)
SODIUM SERPL-SCNC: 137 MMOL/L (ref 136–144)
SODIUM SERPL-SCNC: 138 MMOL/L (ref 136–144)
SODIUM SERPL-SCNC: 140 MMOL/L (ref 136–144)
T AXIS: -52 DEGREES
T4 FREE SERPL-MCNC: 0.9 NG/DL (ref 0.9–1.8)
TIDAL VOLUME: 450 ML
TIDAL VOLUME: 500 ML
TIDAL VOLUME: 550 ML
TOTAL HEMOGLOBIN: 10 G/DL (ref 11.7–16)
TOTAL HEMOGLOBIN: 10 G/DL (ref 11.7–16)
TOTAL HEMOGLOBIN: 10.3 G/DL (ref 11.7–16)
TOTAL HEMOGLOBIN: 10.4 G/DL (ref 11.7–16)
TOTAL HEMOGLOBIN: 10.4 G/DL (ref 11.7–16)
TOTAL HEMOGLOBIN: 10.7 G/DL (ref 11.7–16)
TOTAL HEMOGLOBIN: 10.9 G/DL (ref 11.7–16)
TOTAL HEMOGLOBIN: 11 G/DL (ref 11.7–16)
TOTAL HEMOGLOBIN: 11 G/DL (ref 11.7–16)
TOTAL HEMOGLOBIN: 11.6 G/DL (ref 11.7–16)
TOTAL HEMOGLOBIN: 6.9 G/DL (ref 11.7–16)
TOTAL HEMOGLOBIN: 9.5 G/DL (ref 11.7–16)
TOTAL HEMOGLOBIN: 9.7 G/DL (ref 11.7–16)
TOTAL HEMOGLOBIN: 9.8 G/DL (ref 11.7–16)
TOTAL HEMOGLOBIN: 9.8 G/DL (ref 11.7–16)
TROPONIN I SERPL-MCNC: <0.046 NG/ML (ref ?–0.05)
TSI SER-ACNC: 2.46 MIU/ML (ref 0.35–5.5)
URATE SERPL-MCNC: 1.7 MG/DL (ref 2.4–8)
VENT RATE: 20 /MIN
VENT RATE: 22 /MIN
VENT RATE: 25 /MIN
VENTRICULAR RATE: 92 BPM
WBC # BLD AUTO: 3.9 X10(3) UL (ref 4–13)
WBC # BLD AUTO: 3.9 X10(3) UL (ref 4–13)
WBC # BLD AUTO: 4.5 X10(3) UL (ref 4–13)
WBC # BLD AUTO: 4.7 X10(3) UL (ref 4–13)
WBC # BLD AUTO: 4.9 X10(3) UL (ref 4–13)
WBC # BLD AUTO: 5.4 X10(3) UL (ref 4–13)
WBC # BLD AUTO: 6.2 X10(3) UL (ref 4–13)
WBC # BLD AUTO: 6.3 X10(3) UL (ref 4–13)
WBC # BLD AUTO: 6.4 X10(3) UL (ref 4–13)
WBC # BLD AUTO: 6.4 X10(3) UL (ref 4–13)
WBC # BLD AUTO: 6.6 X10(3) UL (ref 4–13)
WBC # BLD AUTO: 7 X10(3) UL (ref 4–13)
WBC # BLD AUTO: 8 X10(3) UL (ref 4–13)
WBC # BLD AUTO: 8.3 X10(3) UL (ref 4–13)

## 2018-01-01 PROCEDURE — 85025 COMPLETE CBC W/AUTO DIFF WBC: CPT

## 2018-01-01 PROCEDURE — 84439 ASSAY OF FREE THYROXINE: CPT

## 2018-01-01 PROCEDURE — 97530 THERAPEUTIC ACTIVITIES: CPT | Performed by: PHYSICAL THERAPIST

## 2018-01-01 PROCEDURE — 99233 SBSQ HOSP IP/OBS HIGH 50: CPT | Performed by: HOSPITALIST

## 2018-01-01 PROCEDURE — 71045 X-RAY EXAM CHEST 1 VIEW: CPT | Performed by: NURSE PRACTITIONER

## 2018-01-01 PROCEDURE — 71045 X-RAY EXAM CHEST 1 VIEW: CPT | Performed by: INTERNAL MEDICINE

## 2018-01-01 PROCEDURE — 97140 MANUAL THERAPY 1/> REGIONS: CPT | Performed by: PHYSICAL THERAPIST

## 2018-01-01 PROCEDURE — 99232 SBSQ HOSP IP/OBS MODERATE 35: CPT | Performed by: INTERNAL MEDICINE

## 2018-01-01 PROCEDURE — 90935 HEMODIALYSIS ONE EVALUATION: CPT | Performed by: INTERNAL MEDICINE

## 2018-01-01 PROCEDURE — 36415 COLL VENOUS BLD VENIPUNCTURE: CPT

## 2018-01-01 PROCEDURE — 97016 VASOPNEUMATIC DEVICE THERAPY: CPT | Performed by: PHYSICAL THERAPIST

## 2018-01-01 PROCEDURE — 99239 HOSP IP/OBS DSCHRG MGMT >30: CPT | Performed by: HOSPITALIST

## 2018-01-01 PROCEDURE — 99223 1ST HOSP IP/OBS HIGH 75: CPT | Performed by: INTERNAL MEDICINE

## 2018-01-01 PROCEDURE — 05H533Z INSERTION OF INFUSION DEVICE INTO RIGHT SUBCLAVIAN VEIN, PERCUTANEOUS APPROACH: ICD-10-PCS | Performed by: HOSPITALIST

## 2018-01-01 PROCEDURE — 82607 VITAMIN B-12: CPT

## 2018-01-01 PROCEDURE — 93306 TTE W/DOPPLER COMPLETE: CPT | Performed by: INTERNAL MEDICINE

## 2018-01-01 PROCEDURE — 99291 CRITICAL CARE FIRST HOUR: CPT | Performed by: INTERNAL MEDICINE

## 2018-01-01 PROCEDURE — 99309 SBSQ NF CARE MODERATE MDM 30: CPT | Performed by: NURSE PRACTITIONER

## 2018-01-01 PROCEDURE — 80061 LIPID PANEL: CPT

## 2018-01-01 PROCEDURE — 99215 OFFICE O/P EST HI 40 MIN: CPT | Performed by: FAMILY MEDICINE

## 2018-01-01 PROCEDURE — 80053 COMPREHEN METABOLIC PANEL: CPT

## 2018-01-01 PROCEDURE — 84443 ASSAY THYROID STIM HORMONE: CPT

## 2018-01-01 PROCEDURE — 84550 ASSAY OF BLOOD/URIC ACID: CPT

## 2018-01-01 PROCEDURE — 97162 PT EVAL MOD COMPLEX 30 MIN: CPT | Performed by: PHYSICAL THERAPIST

## 2018-01-01 PROCEDURE — 85610 PROTHROMBIN TIME: CPT

## 2018-01-01 PROCEDURE — 5A1D70Z PERFORMANCE OF URINARY FILTRATION, INTERMITTENT, LESS THAN 6 HOURS PER DAY: ICD-10-PCS | Performed by: INTERNAL MEDICINE

## 2018-01-01 PROCEDURE — 82306 VITAMIN D 25 HYDROXY: CPT

## 2018-01-01 PROCEDURE — 99233 SBSQ HOSP IP/OBS HIGH 50: CPT | Performed by: STUDENT IN AN ORGANIZED HEALTH CARE EDUCATION/TRAINING PROGRAM

## 2018-01-01 PROCEDURE — 85045 AUTOMATED RETICULOCYTE COUNT: CPT

## 2018-01-01 PROCEDURE — 99232 SBSQ HOSP IP/OBS MODERATE 35: CPT | Performed by: STUDENT IN AN ORGANIZED HEALTH CARE EDUCATION/TRAINING PROGRAM

## 2018-01-01 PROCEDURE — 71045 X-RAY EXAM CHEST 1 VIEW: CPT | Performed by: HOSPITALIST

## 2018-01-01 PROCEDURE — 99232 SBSQ HOSP IP/OBS MODERATE 35: CPT | Performed by: HOSPITALIST

## 2018-01-01 PROCEDURE — 71045 X-RAY EXAM CHEST 1 VIEW: CPT | Performed by: EMERGENCY MEDICINE

## 2018-01-01 PROCEDURE — 83036 HEMOGLOBIN GLYCOSYLATED A1C: CPT

## 2018-01-01 RX ORDER — ALLOPURINOL 100 MG/1
TABLET ORAL
Qty: 180 TABLET | Refills: 0 | OUTPATIENT
Start: 2018-01-01

## 2018-01-01 RX ORDER — ALBUTEROL SULFATE 2.5 MG/3ML
2.5 SOLUTION RESPIRATORY (INHALATION) EVERY 6 HOURS PRN
Qty: 360 ML | Refills: 0 | Status: SHIPPED | OUTPATIENT
Start: 2018-01-01 | End: 2018-10-01

## 2018-01-01 RX ORDER — MECLIZINE HCL 12.5 MG/1
12.5 TABLET ORAL 3 TIMES DAILY PRN
Status: DISCONTINUED | OUTPATIENT
Start: 2018-01-01 | End: 2018-01-01

## 2018-01-01 RX ORDER — OMEPRAZOLE 20 MG/1
20 CAPSULE, DELAYED RELEASE ORAL DAILY
Qty: 90 CAPSULE | Refills: 0 | Status: SHIPPED | OUTPATIENT
Start: 2018-01-01

## 2018-01-01 RX ORDER — WARFARIN SODIUM 2 MG/1
4 TABLET ORAL
Status: COMPLETED | OUTPATIENT
Start: 2018-01-01 | End: 2018-01-01

## 2018-01-01 RX ORDER — ALBUMIN (HUMAN) 12.5 G/50ML
100 SOLUTION INTRAVENOUS AS NEEDED
Status: DISCONTINUED | OUTPATIENT
Start: 2018-01-01 | End: 2018-01-01

## 2018-01-01 RX ORDER — DIGOXIN 125 MCG
125 TABLET ORAL
Qty: 12 TABLET | Refills: 0 | Status: SHIPPED | OUTPATIENT
Start: 2018-01-01

## 2018-01-01 RX ORDER — ALLOPURINOL 100 MG/1
100 TABLET ORAL DAILY
Qty: 30 TABLET | Refills: 0 | Status: SHIPPED | OUTPATIENT
Start: 2018-01-01

## 2018-01-01 RX ORDER — GABAPENTIN 300 MG/1
300 CAPSULE ORAL NIGHTLY
Qty: 90 CAPSULE | Refills: 1 | Status: SHIPPED | OUTPATIENT
Start: 2018-01-01

## 2018-01-01 RX ORDER — MAGNESIUM SULFATE HEPTAHYDRATE 40 MG/ML
2 INJECTION, SOLUTION INTRAVENOUS ONCE
Status: COMPLETED | OUTPATIENT
Start: 2018-01-01 | End: 2018-01-01

## 2018-01-01 RX ORDER — GABAPENTIN 300 MG/1
300 CAPSULE ORAL NIGHTLY
Qty: 30 CAPSULE | Refills: 0 | Status: SHIPPED | OUTPATIENT
Start: 2018-01-01 | End: 2018-01-01

## 2018-01-01 RX ORDER — ALLOPURINOL 100 MG/1
TABLET ORAL
Qty: 180 TABLET | Refills: 0 | Status: SHIPPED | OUTPATIENT
Start: 2018-01-01 | End: 2018-01-01

## 2018-01-01 RX ORDER — GABAPENTIN 300 MG/1
300 CAPSULE ORAL NIGHTLY
Status: DISCONTINUED | OUTPATIENT
Start: 2018-01-01 | End: 2018-01-01

## 2018-01-01 RX ORDER — ONDANSETRON 2 MG/ML
4 INJECTION INTRAMUSCULAR; INTRAVENOUS EVERY 6 HOURS PRN
Status: DISCONTINUED | OUTPATIENT
Start: 2018-01-01 | End: 2018-01-01

## 2018-01-01 RX ORDER — ACETAMINOPHEN 325 MG/1
650 TABLET ORAL EVERY 6 HOURS PRN
Status: DISCONTINUED | OUTPATIENT
Start: 2018-01-01 | End: 2018-01-01

## 2018-01-01 RX ORDER — ASCORBIC ACID 500 MG
500 TABLET ORAL 2 TIMES DAILY
COMMUNITY

## 2018-01-01 RX ORDER — LIDOCAINE AND PRILOCAINE 25; 25 MG/G; MG/G
CREAM TOPICAL AS NEEDED
Status: DISCONTINUED | OUTPATIENT
Start: 2018-01-01 | End: 2018-01-01

## 2018-01-01 RX ORDER — PANTOPRAZOLE SODIUM 40 MG/1
TABLET, DELAYED RELEASE ORAL
Refills: 0 | COMMUNITY
Start: 2018-01-01 | End: 2018-01-01

## 2018-01-01 RX ORDER — LEVOTHYROXINE SODIUM 0.12 MG/1
TABLET ORAL
Qty: 90 TABLET | Refills: 0 | Status: SHIPPED | OUTPATIENT
Start: 2018-01-01 | End: 2018-01-01

## 2018-01-01 RX ORDER — WARFARIN SODIUM 7.5 MG/1
7.5 TABLET ORAL SEE ADMIN INSTRUCTIONS
Status: ON HOLD | COMMUNITY
End: 2018-01-01

## 2018-01-01 RX ORDER — MIDODRINE HYDROCHLORIDE 5 MG/1
20 TABLET ORAL 3 TIMES DAILY
Status: DISCONTINUED | OUTPATIENT
Start: 2018-01-01 | End: 2018-01-01

## 2018-01-01 RX ORDER — DIGOXIN 0.25 MG/ML
250 INJECTION INTRAMUSCULAR; INTRAVENOUS ONCE
Status: COMPLETED | OUTPATIENT
Start: 2018-01-01 | End: 2018-01-01

## 2018-01-01 RX ORDER — WARFARIN SODIUM 4 MG/1
4 TABLET ORAL DAILY
Qty: 30 TABLET | Refills: 0 | Status: SHIPPED | OUTPATIENT
Start: 2018-01-01 | End: 2018-10-01

## 2018-01-01 RX ORDER — DOCUSATE SODIUM 100 MG/1
100 CAPSULE, LIQUID FILLED ORAL 2 TIMES DAILY
Status: DISCONTINUED | OUTPATIENT
Start: 2018-01-01 | End: 2018-01-01

## 2018-01-01 RX ORDER — SEVELAMER CARBONATE 800 MG/1
1600 TABLET, FILM COATED ORAL
Status: DISCONTINUED | OUTPATIENT
Start: 2018-01-01 | End: 2018-01-01

## 2018-01-01 RX ORDER — WARFARIN SODIUM 5 MG/1
5 TABLET ORAL NIGHTLY
Status: DISCONTINUED | OUTPATIENT
Start: 2018-01-01 | End: 2018-01-01

## 2018-01-01 RX ORDER — LEVOTHYROXINE SODIUM 0.12 MG/1
125 TABLET ORAL
Status: DISCONTINUED | OUTPATIENT
Start: 2018-01-01 | End: 2018-01-01

## 2018-01-01 RX ORDER — BUDESONIDE 0.25 MG/2ML
0.25 INHALANT ORAL 2 TIMES DAILY
COMMUNITY
Start: 2018-01-01 | End: 2018-09-11

## 2018-01-01 RX ORDER — MIDODRINE HYDROCHLORIDE 5 MG/1
5 TABLET ORAL
Status: DISCONTINUED | OUTPATIENT
Start: 2018-01-01 | End: 2018-01-01

## 2018-01-01 RX ORDER — MIDODRINE HYDROCHLORIDE 10 MG/1
10 TABLET ORAL 3 TIMES DAILY
Status: DISCONTINUED | OUTPATIENT
Start: 2018-01-01 | End: 2018-01-01

## 2018-01-01 RX ORDER — LEVOTHYROXINE SODIUM 0.12 MG/1
125 TABLET ORAL
Qty: 90 TABLET | Refills: 0 | Status: SHIPPED | OUTPATIENT
Start: 2018-01-01

## 2018-01-01 RX ORDER — DIGOXIN 125 MCG
125 TABLET ORAL
Status: DISCONTINUED | OUTPATIENT
Start: 2018-01-01 | End: 2018-01-01

## 2018-01-01 RX ORDER — PANTOPRAZOLE SODIUM 20 MG/1
20 TABLET, DELAYED RELEASE ORAL
Status: DISCONTINUED | OUTPATIENT
Start: 2018-01-01 | End: 2018-01-01

## 2018-01-01 RX ORDER — CALCIUM ACETATE 667 MG/1
2 CAPSULE ORAL SEE ADMIN INSTRUCTIONS
COMMUNITY
Start: 2018-01-01

## 2018-01-01 RX ORDER — CEFAZOLIN SODIUM/WATER 2 G/20 ML
2 SYRINGE (ML) INTRAVENOUS ONCE
Status: COMPLETED | OUTPATIENT
Start: 2018-01-01 | End: 2018-01-01

## 2018-01-01 RX ORDER — ALLOPURINOL 100 MG/1
100 TABLET ORAL DAILY
Qty: 90 TABLET | Refills: 0 | Status: ON HOLD | COMMUNITY
Start: 2018-01-01 | End: 2018-01-01

## 2018-01-01 RX ORDER — MIDODRINE HYDROCHLORIDE 10 MG/1
10 TABLET ORAL 2 TIMES DAILY PRN
Status: DISCONTINUED | OUTPATIENT
Start: 2018-01-01 | End: 2018-01-01

## 2018-01-01 RX ORDER — GABAPENTIN 300 MG/1
300 CAPSULE ORAL NIGHTLY
Qty: 30 CAPSULE | Refills: 2 | OUTPATIENT
Start: 2018-01-01 | End: 2018-01-01

## 2018-01-01 RX ORDER — MIDODRINE HYDROCHLORIDE 10 MG/1
20 TABLET ORAL 3 TIMES DAILY
Qty: 180 TABLET | Refills: 0 | Status: SHIPPED | OUTPATIENT
Start: 2018-01-01 | End: 2018-10-01

## 2018-01-01 RX ORDER — MIDODRINE HYDROCHLORIDE 5 MG/1
10 TABLET ORAL
Status: DISCONTINUED | OUTPATIENT
Start: 2018-01-01 | End: 2018-01-01

## 2018-01-01 RX ORDER — FLUDROCORTISONE ACETATE 0.1 MG/1
0.1 TABLET ORAL DAILY
Qty: 30 TABLET | Refills: 0 | Status: SHIPPED | OUTPATIENT
Start: 2018-01-01 | End: 2018-10-02

## 2018-01-01 RX ORDER — SEVELAMER CARBONATE 800 MG/1
800 TABLET, FILM COATED ORAL SEE ADMIN INSTRUCTIONS
Status: ON HOLD | COMMUNITY
End: 2018-01-01

## 2018-01-01 RX ORDER — HEPARIN SODIUM 1000 [USP'U]/ML
1.5 INJECTION, SOLUTION INTRAVENOUS; SUBCUTANEOUS ONCE
Status: DISCONTINUED | OUTPATIENT
Start: 2018-01-01 | End: 2018-01-01

## 2018-01-01 RX ORDER — WARFARIN SODIUM 2.5 MG/1
2.5 TABLET ORAL
Status: COMPLETED | OUTPATIENT
Start: 2018-01-01 | End: 2018-01-01

## 2018-01-01 RX ORDER — FLUDROCORTISONE ACETATE 0.1 MG/1
0.1 TABLET ORAL DAILY
Status: DISCONTINUED | OUTPATIENT
Start: 2018-01-01 | End: 2018-01-01

## 2018-01-01 RX ORDER — GABAPENTIN 300 MG/1
CAPSULE ORAL
Qty: 60 CAPSULE | Refills: 1 | Status: SHIPPED | OUTPATIENT
Start: 2018-01-01 | End: 2018-01-01

## 2018-01-01 RX ORDER — SODIUM CHLORIDE 0.9 % (FLUSH) 0.9 %
10 SYRINGE (ML) INJECTION EVERY 12 HOURS
Status: DISCONTINUED | OUTPATIENT
Start: 2018-01-01 | End: 2018-01-01

## 2018-01-01 RX ORDER — ALLOPURINOL 100 MG/1
100 TABLET ORAL DAILY
Status: DISCONTINUED | OUTPATIENT
Start: 2018-01-01 | End: 2018-01-01

## 2018-01-01 RX ORDER — WARFARIN SODIUM 7.5 MG/1
7.5 TABLET ORAL NIGHTLY
Status: DISCONTINUED | OUTPATIENT
Start: 2018-01-01 | End: 2018-01-01

## 2018-01-01 RX ORDER — SEVELAMER CARBONATE 800 MG/1
1600 TABLET, FILM COATED ORAL
COMMUNITY

## 2018-01-01 RX ORDER — CALCIUM ACETATE 667 MG/1
2 CAPSULE ORAL SEE ADMIN INSTRUCTIONS
Status: DISCONTINUED | OUTPATIENT
Start: 2018-01-01 | End: 2018-01-01

## 2018-01-01 RX ORDER — WARFARIN SODIUM 5 MG/1
5 TABLET ORAL SEE ADMIN INSTRUCTIONS
Status: ON HOLD | COMMUNITY
End: 2018-01-01

## 2018-01-01 RX ORDER — ALBUTEROL SULFATE 2.5 MG/3ML
2.5 SOLUTION RESPIRATORY (INHALATION)
Status: DISCONTINUED | OUTPATIENT
Start: 2018-01-01 | End: 2018-01-01

## 2018-01-01 RX ORDER — WARFARIN SODIUM 1 MG/1
1 TABLET ORAL
Status: COMPLETED | OUTPATIENT
Start: 2018-01-01 | End: 2018-01-01

## 2018-01-01 RX ORDER — SODIUM CHLORIDE 0.9 % (FLUSH) 0.9 %
10 SYRINGE (ML) INJECTION AS NEEDED
Status: DISCONTINUED | OUTPATIENT
Start: 2018-01-01 | End: 2018-01-01

## 2018-01-01 RX ORDER — SODIUM BICARBONATE 325 MG/1
650 TABLET ORAL 3 TIMES DAILY
Status: DISCONTINUED | OUTPATIENT
Start: 2018-01-01 | End: 2018-01-01

## 2018-01-01 RX ORDER — OMEPRAZOLE 20 MG/1
CAPSULE, DELAYED RELEASE ORAL
Qty: 90 CAPSULE | Refills: 0 | Status: SHIPPED | OUTPATIENT
Start: 2018-01-01 | End: 2018-01-01

## 2018-01-01 RX ORDER — POTASSIUM CHLORIDE 20 MEQ/1
20 TABLET, EXTENDED RELEASE ORAL ONCE
Status: COMPLETED | OUTPATIENT
Start: 2018-01-01 | End: 2018-01-01

## 2018-01-23 NOTE — TELEPHONE ENCOUNTER
Pt called she is at dialysis until 2:00 today. She wants to know if she should be getting the Hepatis shots 4 of them in the series. She can be reached at [phone] until 2:00 p.m.

## 2018-01-23 NOTE — TELEPHONE ENCOUNTER
Patient recommended to have Hep B vaccines(series of 4 over 6 months) through dialysis center.   Patient would like to know if Dr Phi Dorsey recommends she get these vaccines  Please advise

## 2018-02-02 PROBLEM — T82.590A DIALYSIS AV FISTULA MALFUNCTION (HCC): Status: ACTIVE | Noted: 2018-01-01

## 2018-02-27 NOTE — TELEPHONE ENCOUNTER
Faxed refill request received from pharmacy for gabapentin 300 mg capsules  Rx approved qty 60 + 1 refill

## 2018-03-12 ENCOUNTER — PRIOR ORIGINAL RECORDS (OUTPATIENT)
Dept: OTHER | Age: 73
End: 2018-03-12

## 2018-05-29 NOTE — TELEPHONE ENCOUNTER
Pt requesting refill on gabapentin 300 MG Oral Cap be sent to the [address] [place] [address] on [address] [facility] [place]. Patient currently has no more pills.

## 2018-06-15 NOTE — PROGRESS NOTES
Brian Johnson is a 68year old female. HPI:       Hypothyroidism:  Has been stable in the past.  Patient taking levothyroxine 125 mcg daily.   Tolerating levothyroxine well, denies side effects such as chest pain, palpitations, changes in hair skin degrees. Wt Readings from Last 6 Encounters:  06/15/18 : 285 lb 0.9 oz  02/02/18 : 260 lb  11/08/17 : 271 lb 6.2 oz  11/06/17 : 265 lb  11/01/17 : 265 lb  10/23/17 : 265 lb 6.9 oz     Body mass index is 52.14 kg/m².           Current Outpatient Pre Anemia    • Anticoagulated on warfarin 4/23/2017   • Arrhythmia     afib   • At risk for falling 7/26/2015   • Atrial fibrillation [facility])    • Atrial fibrillation with controlled ventricular response ([facility]) 11/22/2012   • Atrial fibrillation with slow ventri disorder [facility])    • Seizure disorder ([facility]) 6/25/2016    This diagnosis is in question.   Patient is currently off of medication after being evaluated by the neurologist.    • Sleep apnea     pt states she wears a nasal cannula 2L   • Unsteady gait 1/16/2017 pleasant well-groomed morbidly obese pleasant  female sitting in a wheelchair  SKIN: Bilateral lower extremities are brawny  HEAD: NCAT  NECK: supple, no adenopathy, no thyromegaly, no masses  LUNGS: CTA A/P no wheezes/ronchi/rales.   Low bibasilar Monocytes %      % 7.7 9.9 8.0   Eosinophils %      % 1.7 2.3 2.6   Basophils %      % 0.5 0.7 0.3   Immature Granulocyte %      % 0.5 0.3 0.3   Glucose      70 - 99 mg/dL 100 (H) 109 (H) 120 (H)   BUN      8 - 20 mg/dL 31 (H) 20 34 (H)   CREATININE dialysis on the same day. 2. Morbid obesity with BMI of 50.0-59.9, adult ([facility]  Weight loss recommended. Patient encouraged to make dietary changes. Patient declines consultation with dietitian.   Patient does not walk, has difficulty walking secondary call patient and decrease allopurinol 100 mg from twice a day down to once a day then recheck uric acid level. Consider discontinuation of allopurinol altogether in the near future. - URIC ACID, SERUM; Future    12.  Macrocytic anemia  Patient referred

## 2018-06-15 NOTE — PATIENT INSTRUCTIONS
Please make an appointment to be seen at the Lymphedema clinic. Lymphedema  The lymphatic system is made up of lymph vessels and lymph nodes, which carry a fluid called lymph. Lymph consists of waste from the cells.  This fluid drains throu tight clothing or jewelry on the affected arm or leg. Avoid carrying bags or other weight on the affected arm. · Save with an electric razor instead of a razor blade.   · If at all possible, don’t have blood pressure taken, get injections, or have blood dr

## 2018-06-17 PROBLEM — E46 HYPOALBUMINEMIA DUE TO PROTEIN-CALORIE MALNUTRITION (HCC): Status: ACTIVE | Noted: 2018-01-01

## 2018-06-17 PROBLEM — E88.09 HYPOALBUMINEMIA DUE TO PROTEIN-CALORIE MALNUTRITION (HCC): Status: ACTIVE | Noted: 2018-01-01

## 2018-06-17 NOTE — TELEPHONE ENCOUNTER
Agnes Arndt,    1. Please call patient's nephrologist and request most recent office visit progress note be faxed to us, please let me know when we have received the progress note.   2.  Please ask nephrologist's office if patient is still receiving Aranesp

## 2018-06-18 NOTE — TELEPHONE ENCOUNTER
Pt called back and she just wanted to know if our office had received the note from Dr. Lexy Boogie office and explained that as of now note not received but will check again in the AM.

## 2018-06-18 NOTE — TELEPHONE ENCOUNTER
Spoke to patient with instructions and pt verbalized understanding. Dr. Shemar Gloria [phone] called and was told have to call the dialysis unit [phone].   Called and spoke to Dontae and she will fax over the most recent note along with if t

## 2018-06-20 NOTE — TELEPHONE ENCOUNTER
Dialysis note from nephrologist, Dr. Keri Bello reviewed.   Staff to add the following to med list: Mircera and Venofer

## 2018-06-22 NOTE — TELEPHONE ENCOUNTER
Pt called stating she received a Letter from Triston Mello yesterday about test could not give further info since she does not understand it and would like to speak to Triston Mello.

## 2018-06-22 NOTE — TELEPHONE ENCOUNTER
Spoke to Alley Childers and she received the copies of the labs that are due in July and September and just wanted to confirm.   Pt verbalized understanding

## 2018-07-23 ENCOUNTER — PRIOR ORIGINAL RECORDS (OUTPATIENT)
Dept: OTHER | Age: 73
End: 2018-07-23

## 2018-07-27 NOTE — TELEPHONE ENCOUNTER
Lymphedema clinic # is [phone].   Called pt but she states she had a bad connection and is going to call office back

## 2018-07-27 NOTE — TELEPHONE ENCOUNTER
Pt states she was advised to make an appointment with the Lymphedema clinic but patient has no info and would like a call with info so she can call and make an appointment.

## 2018-07-30 NOTE — TELEPHONE ENCOUNTER
Spoke to patient with results/instructions. Path results still pending. Pt verbalized understanding. Med list updated and lab in system.

## 2018-07-30 NOTE — TELEPHONE ENCOUNTER
Notes recorded by Lia Lockett DO on 7/29/2018 at 6:58 PM CDT  1.  Please forward reticulocyte count, RBC morphology, path comment on CBC and CBC to patient's hematologist.  2.  Please call patient and have her decrease allopurinol 100 mg from twice a

## 2018-08-06 NOTE — PROGRESS NOTES
PHYSICAL THERAPY LE LYMPHEDEMA EVALUATION:   Referring Physician: Dr. Andrew Manley  Diagnosis: B LE Lymphedema    Date of Service: 8/6/2018     PATIENT Angel Ramsay is a 68year old female who presents with stage 3 B LE lymphedema.   Pt reports th findings include   Diagnosis  Current Medical Diagnoses:     Unspecified urinary incontinence     Morbid obesity ([facility]     Unspecified venous (peripheral) insufficiency     Osteoarthritis     Gout     Anemia     Right knee pain     At risk for falling nails on B LE. Pt with poor sensation in B feet at reports mild discomfort secondary to wound. Pt refused supine positioning for therapy indicating that she is most comfortable to sit in WC.   Pt with increased low back pain with knee extended in modified precautions; skin care, bandaging in B LE, MLD to L LE from knee to foot  Compression:  L LE: abd pad, cellona, comprilan 6,8,10. R LE: cellona, tensiogrip F, comprilan 6,8,10      Charges: PT Eval x1, Manual:1, Theract:2      Total Timed Treatment: 45 min your referral. Please co-sign or sign and return this letter via fax as soon as possible to [phone].  If you have any questions, please contact me at Dept: [phone]    Sincerely,  Electronically signed by therapist: Vern Moritz, PT    Physician's

## 2018-08-08 NOTE — PROGRESS NOTES
Dx: Kaitlyn Neighbors         Authorized # of Visits:  21         Next MD visit: none scheduled  Fall Risk: standard         Precautions: n/a             Subjective:   Pt reports that she was she still has the bandages on B LE but started to have some discomf compliant with standing tolerance as well as ankle pumps and leg kicks at home.   PT cont to work with vendors on attaining compression garments and pneumatic pump for long term use secondary to inability to perform SMLD and poor improvement and inability t tensiogrip F, comprilan 6,8,10    Charges: Manual:3, Theract:1       Total Timed Treatment: 60 min  Total Treatment Time: 60 min

## 2018-08-13 NOTE — PROGRESS NOTES
Dx: Noel Antony         Authorized # of Visits:  21         Next MD visit: none scheduled  Fall Risk: standard         Precautions: n/a             Subjective:   Pt reports that her L LE she had to remove on Friday as she went to podiatrist and he remov reported that she will try next tx session. PT disucssed with pt and spouse the importance of scheduling venous specialist to make sure she has proper blood supply and reviewed and issued pictures for HEP.  Pt cont to be limited with overall mobility kermit LE: cellona, tensiogrip F, comprilan 6,8,10    Charges: Manual:3, Theract:1, Pump: 1       Total Timed Treatment: 60 min  Total Treatment Time: 60 min

## 2018-08-15 NOTE — PROGRESS NOTES
Dx: Jeremy Moore         Authorized # of Visits:  21         Next MD visit: none scheduled  Fall Risk: standard         Precautions: n/a             Subjective:   Pt report that she has had less cramping in B LE during dialysis yesterday.  Pt reports incr with position. Pt with decreased girth in B LE with greatest improvement in ankle and foot but cont with increased density and induration along medial distal thigh. Goals:   Pt will be independent in daily skin care.  10 sessions   Pt will tolerate BLE min  Total Treatment Time: 60 min

## 2018-08-16 PROBLEM — R53.1 WEAKNESS: Status: ACTIVE | Noted: 2018-01-01

## 2018-08-16 PROBLEM — E66.01 MORBID OBESITY WITH BMI OF 50.0-59.9, ADULT (HCC): Status: ACTIVE | Noted: 2018-01-01

## 2018-08-16 PROBLEM — I89.0 LYMPHEDEMA: Status: ACTIVE | Noted: 2018-01-01

## 2018-08-16 PROBLEM — I50.9 ACUTE ON CHRONIC CONGESTIVE HEART FAILURE, UNSPECIFIED HEART FAILURE TYPE (HCC): Status: ACTIVE | Noted: 2018-01-01

## 2018-08-16 NOTE — ED NOTES
Called [facility] kidney [facility]  Notified them that the patient will be missing dialysis today

## 2018-08-16 NOTE — CONSULTS
[facility]  Report of Consultation    Jacquelyn Matadle Patient Status:  Inpatient    1945 MRN [id number]   [facility] 3NE-A Attending Michelle Huerta MD   Hosp Day # 0 PCP Juancarlos Douglas DO     Reason for Consultation:  ESRD obesity with BMI of 50.0-59.9, adult ([facility] 1/16/2017   • Muscle weakness    • NSVT (nonsustained ventricular tachycardia) ([facility] 10/5/2013   • ISABELLE (obstructive sleep apnea)    • Osteoarthritis    • Peripheral vascular disease (HCC)    • Permanent atrial fib Levothyroxine Sodium (SYNTHROID, LEVOTHROID) tab 125 mcg, 125 mcg, Oral, Before breakfast  •  metoprolol Tartrate (LOPRESSOR) tab 25 mg, 25 mg, Oral, 2x Daily(Beta Blocker)  •  Midodrine HCl (PROAMATINE) tab 10 mg, 10 mg, Oral, BID PRN  •  [START ON 8/17/2 NITROGEN (BUN) (mg/dL)   Date Value   10/04/2011 18   06/28/2011 26 (H)   ----------  CREATININE (mg/dL)   Date Value   07/04/2014 2.52 (H)   04/24/2014 1.82 (H)   03/25/2014 2.21 (H)   10/04/2011 1.18   06/28/2011 1.63 (H)   ----------  Creatinine (mg/dL)

## 2018-08-16 NOTE — ED NOTES
Dr Nishant Parada at the bedside she removed the bandages from her lower extremities. There is a wound she requested that the RN on the admitting side take a picture of the wound.  I notified Shayla Josue with her verbal orders

## 2018-08-16 NOTE — HISTORICAL OFFICE NOTE
Kike Batista  : 1945  ACCOUNT:  [id number]  [phone]  PCP: Dr. Flex Oliver     TODAY'S DATE: 2018  DICTATED BY:  [Dr. Marbella Batres: [Followup of Atrial fibrillation, chronic, Followup of Hypertension and carotid pulses normal. ABD AORTA: difficult to assess abdominal aorta. FEM: femoral pulses intact. PEDAL: pedal pulses intact. EXT: 1+ edema bilaterally to knees.      DECISION MAKING:    #1 chronic renal failure on dialysis  2 chronic atrial fibrillation w

## 2018-08-16 NOTE — ED PROVIDER NOTES
Patient Seen in: [facility] Emergency Department    History   Patient presents with:  Fatigue (constitutional, neurologic)    Stated Complaint: weakness    HPI    46-year-old female with multiple medical problems including end-stage renal disease on protein-calorie malnutrition ([facility] 6/17/2018   • Hypothyroidism    • Low HDL (under 40) 6/25/2016   • Lymphedema     BILAT LOWER EXTREMITES   • Methicillin resistant Staphylococcus aureus in conditions classified elsewhere and of unspecified site 6/29/2012 Vitals [08/16/18 1032]  BP: 118/55  Pulse: 97  Resp: 23  Temp: 97.9 °F (36.6 °C)  Temp src: n/a  SpO2: (!) 80 %  O2 Device: Nasal cannula    Current:BP 94/54   Pulse 99   Temp 97.9 °F (36.6 °C)   Resp 23   Ht 157.5 cm (5' 2\")   Wt (!) 139 kg   SpO2 100% 38.4 (*)     MCHC 30.2 (*)     RDW 16.2 (*)     RDW-SD 75.8 (*)     Neutrophil Absolute Prelim 7.53 (*)     Neutrophil Absolute 7.53 (*)     Lymphocyte Absolute 0.32 (*)     All other components within normal limits   TROPONIN I - Normal   CBC WITH DIFFERE generalized weakness and immobility. She is unable to walk and manage at home at this time. She will be brought in for med adjustment, dialysis and further treatment.           Disposition and Plan     Clinical Impression:  Weakness  (primary encounter di

## 2018-08-16 NOTE — ED INITIAL ASSESSMENT (HPI)
Medics were called for a lift assist to take to  her dialysis appointment. She was to weak and came here more so than normal she walks very little at home  She has a wheel chair at home     She started yesterday at the [facility] .  It seems like her

## 2018-08-16 NOTE — H&P
[facility]  History and Physical     Lois Norris Patient Status:  Inpatient    1945 MRN [id number]   [facility] 3NE-A Attending Mingo Alas MD   Hosp Day # 0 PCP Reji Vazquez DO     Chief Complaint: SOB    Histor • Lymphedema     BILAT LOWER EXTREMITES   • Methicillin resistant Staphylococcus aureus in conditions classified elsewhere and of unspecified site 6/29/2012   • Mixed hyperlipidemia 6/25/2016   • Morbid obesity with BMI of 50.0-59.9, adult ([facility] 1/16/201 Prescriptions on File Prior to Encounter:  allopurinol 100 MG Oral Tab Take 1 tablet (100 mg total) by mouth daily.  Disp: 90 tablet Rfl: 0   Levothyroxine Sodium 125 MCG Oral Tab Take 1 tablet (125 mcg total) by mouth before breakfast. Disp: 90 tablet Rfl: acute distress. Alert and oriented x 3. HEENT: Normocephalic atraumatic. Moist mucous membranes. EOM-I. PERRLA. Anicteric. Neck: No lymphadenopathy. No JVD. No carotid bruits.   Respiratory: diminished BS b/l without wheezing   Cardiovascular: S1, S2. IR full  · Marie: none    Plan of care discussed with patient , spouse and ER.     Jessee Dean MD  4/52/5101          **Certification      PHYSICIAN Certification of Need for Inpatient Hospitalization - Initial Certification    Patient will require inpatient

## 2018-08-16 NOTE — CONSULTS
INFECTIOUS DISEASE CONSULTATION    Josh Fallon Patient Status:  Inpatient    1945 MRN [id number]   [facility] 3NE-A Attending Aliya Vigil MD   Hosp Day # 0 PCP Claire Eddy 6/29/2012   • Mixed hyperlipidemia 6/25/2016   • Morbid obesity with BMI of 50.0-59.9, adult ([facility] 1/16/2017   • Muscle weakness    • NSVT (nonsustained ventricular tachycardia) ([facility] 10/5/2013   • ISABELLE (obstructive sleep apnea)    • Osteoarthritis    • Per Oral, Nightly  •  [START ON 8/17/2018] Levothyroxine Sodium (SYNTHROID, LEVOTHROID) tab 125 mcg, 125 mcg, Oral, Before breakfast  •  metoprolol Tartrate (LOPRESSOR) tab 25 mg, 25 mg, Oral, 2x Daily(Beta Blocker)  •  Midodrine HCl (PROAMATINE) tab 10 mg, 10 (peripheral) insufficiency     Osteoarthritis     Gout     Anemia     Right knee pain     At risk for falling     ESRD (end stage renal disease) ([facility]     History of diabetes mellitus, type II     Mixed hyperlipidemia     Low HDL (under 40)     Onychomycos

## 2018-08-16 NOTE — CONSULTS
[facility]  Report of Consultation    Taylerriasha Hamm Patient Status:  Inpatient    1945 MRN [id number]   [facility] 3NE-A Attending Linus Leiva MD   Hosp Day # 0 PCP Jordon Blanco DO     Reason for Consultation:  CHF. Methicillin resistant Staphylococcus aureus in conditions classified elsewhere and of unspecified site 6/29/2012   • Mixed hyperlipidemia 6/25/2016   • Morbid obesity with BMI of 50.0-59.9, adult ([facility] 1/16/2017   • Muscle weakness    • NSVT (nonsustained (PHOSLO) cap 1,334 mg, 2 capsule, Oral, See Admin Instructions  •  gabapentin (NEURONTIN) cap 300 mg, 300 mg, Oral, Nightly  •  [START ON 8/17/2018] Levothyroxine Sodium (SYNTHROID, LEVOTHROID) tab 125 mcg, 125 mcg, Oral, Before breakfast  •  metoprolol Ta moderated RV pressure overload. CXR: Fluid overload.     Labs:     Lab Results  Component Value Date   WBC 8.3 08/16/2018   RBC 2.79 08/16/2018   HGB 10.7 08/16/2018   HCT 35.4 08/16/2018   .9 08/16/2018   MCH 38.4 08/16/2018   MCHC 30.2 08/16/2018 obesity with suspected ISABELLE. Has been unable to get in for a formal sleep study. I would recommend trial of auto titrating CPAP. DM with ESRF. Probable cor pulmonale due to ISABELLE and obesity. Multifactorial LE edema due above plus lymphedema.   Echo orde

## 2018-08-17 NOTE — CONSULTS
[facility]  Report of Consultation    Yesenia Peralta Patient Status:  Inpatient    1945 MRN [id number]   [facility] 3NE-A Attending Stan Guzmán MD   Hosp Day # 1 PCP Boo Gloria DO     Reason for Consultation: Shireen Hernandez debridement skin and sofr tissue of buttocks  1/1/2002: OTHER SURGICAL HISTORY      Comment: ex. axillary mass  Family History   Problem Relation Age of Onset   • Heart Disease Brother    • Diabetes Brother       reports that she has never smoked.  She has Unknown time   Saline Nasal Spray 0.65 % Nasal Solution 2 sprays by Nasal route nightly as needed. Disp:  Rfl:  Past Week at Unknown time   acetaminophen (TYLENOL) 325 MG Oral Tab Take 2 tablets by mouth every 6 (six) hours as needed.  Disp: 60 tablet Rfl S1S2, no murmur. Abdomen: soft, non-tender, non-distended, no masses, no guarding, no     rebound, positive BS. Extremity: 3-4+ lower extremity/dependent edema, no cyanosis   Skin: No rashes or lesions.    Neurological: Alert, interactive, no focal defi for treatment of both her azotemia and fluid overload in the face of her hypotension. Pressor agents as needed. We will continue to follow this critically ill patient with you closely. Kerry Pat SR.  Crit care 40  8/17/2018  10:17 AM

## 2018-08-17 NOTE — PROGRESS NOTES
[facility]  Progress Note     Josh Fallon Patient Status:  Inpatient    1945 MRN [id number]   [facility] 3NE-A Attending Aliya Vigil MD   Hosp Day # 1 PCP Claire Eddy DO     Chief Complaint: HYPOTENSION    S: Michelle See Admin Instructions   • gabapentin  300 mg Oral Nightly   • Levothyroxine Sodium  125 mcg Oral Before breakfast   • metoprolol Tartrate  25 mg Oral 2x Daily(Beta Blocker)   • Pantoprazole Sodium  20 mg Oral QAM AC   • Sevelamer Carbonate  1,600 mg Oral

## 2018-08-17 NOTE — PHYSICAL THERAPY NOTE
Orders received and hx and chart reviewed. Pt has an INR of 4.78 and most recent BP 85/40 in supine, so will hold PT for now. RN, Irl Daughters, in agreement. Will attempt again as pt is appropriate and schedule allows. Thank you.

## 2018-08-17 NOTE — PLAN OF CARE
1730: took over patient from [facility]. AT [place]. Picc line in placed. Art line intact. Swabs and sips of water tolerated. Vital signs monitored. HD ongoing. Levophed up to 6 mcq. To keep sbp >85.

## 2018-08-17 NOTE — OCCUPATIONAL THERAPY NOTE
IP OT attempt to see patient for therapeutic assessment/treatment. Pt wi BP of 8540 currently and INR of 4.748, trending up.  Pt to be placed on HOLD from IP OT services until BP and INR ranges WNL and appropriate for patient to participate in meaninful ass

## 2018-08-17 NOTE — PROGRESS NOTES
[facility]  Progress Note    Maynor Florian Fallon Patient Status:  Inpatient    1945 MRN [id number]   [facility] 3NE-A Attending Linus Leiva MD   Hosp Day # 1 PCP Jordon Blanco,      Subjective:  Started on CPAP for progres mellitus, type II    Mixed hyperlipidemia    ESRD on hemodialysis ([facility]    Weakness    Lymphedema    Morbid obesity with BMI of 50.0-59.9, adult ([facility]      Morbid obesity with ISABELLE, acute respiratory failure. Pulmonary consult appreciated.   ESRF, still wit

## 2018-08-17 NOTE — OCCUPATIONAL THERAPY NOTE
IP OT attempt to see patient for therapeutic assessment/treatment. Pt intially in room 3600, transferred to ICU, Rm 457. Current IP OT assessment orders DC'd, per department protocol with decline in current status. Please re-consult should need arise. Shayla Roque

## 2018-08-17 NOTE — PROGRESS NOTES
08/17/18 1422   Clinical Encounter Type   Visited With Patient and family together   Continue Visiting (Kalin Solo will remain available at the pager # [phone] as needed/requested)   Sacramental Encounters   Sacrament of Sick-Anointing Anointed   The patient

## 2018-08-17 NOTE — CONSULTS
[facility]  Progress Note    Maynor Florian Fallon Patient Status:  Inpatient    1945 MRN [id number]   [facility] 3NE-A Attending Linus Leiva MD   Hosp Day # 1 PCP Jordon Blanco, DO       Hypotensive this am; last night unable to Appropriate mood and affect.     Laboratory:    Lab Results  Component Value Date   WBC 6.3 08/17/2018   HGB 9.8 08/17/2018   HCT 33.0 08/17/2018   PLT 87.0 08/17/2018   CREATSERUM 7.58 08/17/2018   BUN 51 08/17/2018    08/17/2018   K 5.0 08/17/2018

## 2018-08-17 NOTE — CONSULTS
[facility]  Report of Inpatient Wound Care Consultation    Phil Norris Patient Status:  Inpatient    1945 MRN [id number]   [facility] 3NE-A Attending Aj Trejo MD   Hosp Day # 1 PCP Narda Soliz DO     Reason for Co weakness    • NSVT (nonsustained ventricular tachycardia) ([facility]) 10/5/2013   • ISABELLE (obstructive sleep apnea)    • Osteoarthritis    • Per[facility] vascular disease ([facility]    • Permanent atrial fibrillation ([facility] 9/20/2017   • Pneumonia of right lower lobe due L4.3revB9mlrR.1cm 100% red granulation tissue, drainage is moderate serosanguinous.   Periwound skin without erythema but with chronic venous skin changes          PLAN OF CARE:   To left lateral lower leg, remove pt's velcro style compression wrap, change

## 2018-08-17 NOTE — PROGRESS NOTES
[facility]                INFECTIOUS DISEASE PROGRESS NOTE    Adelaide Awa nKightland Patient Status:  Inpatient    1945 MRN [id number]   [facility] 4SW-A Attending Stan Guzmán MD   Hosp Day # 1 PCP Adonay Ventura DO     Antib disease) ([facility]     History of diabetes mellitus, type II     Mixed hyperlipidemia     Low HDL (under 40)     Onychomycosis     Tinea pedis of both feet     Morbid obesity with BMI of 45.0-49.9, adult (HCC)     Bilateral edema of lower extremity     Unstead

## 2018-08-17 NOTE — PROGRESS NOTES
2245 B/p 73/45 updated Dr. Joe Bernard nephrologist, verbal orders received for 100ml of albumin and 10mg midodrine.    Keep SBP between 75-80 during HD  2330 SBP >80 HD started  0010 B/p 68/36 called and updated Dr. Joe Bernard Nephrologist verbal orders received to

## 2018-08-17 NOTE — PROGRESS NOTES
Patient received this am. Initial SBP in 60s. Pt drowsy, confused. Dr. Reji Rowe and Dr. Ivy Abbott paged to notify. Orders for 250 cc bolus, po midodrine and albumin ordered to give. Pts IV not working. PICC nurse to come place midline.  ABGs ordered as well, the

## 2018-08-17 NOTE — PLAN OF CARE
CARDIOVASCULAR - ADULT    • Maintains optimal cardiac output and hemodynamic stability Not Progressing    • Absence of cardiac arrhythmias or at baseline Not Progressing          RESPIRATORY - ADULT    • Achieves optimal ventilation and oxygenation Not Pro

## 2018-08-17 NOTE — BH PROGRESS NOTE
Was going to see the pt for f/u for phq4 score. Talked with patients nurse and she said, she just transferred to ICU from medical unit. Patient currently on bipap. [place] liaison can check on pt at a later date and time.

## 2018-08-17 NOTE — OCCUPATIONAL THERAPY NOTE
Patient transferred to ICU this am d/t increasing respiratory needs, rising INR and hypotension. Will require new PT/OT orders to eval and treat when medically appropriate.

## 2018-08-18 NOTE — PROGRESS NOTES
[facility]  Progress Note    Corrinne Kelbyeugene Norris Patient Status:  Inpatient    1945 MRN [id number]   [facility] 4SW-A Attending Adelia Tomlinson MD   [facility] Day # 2 PCP Bhumika Rivera,      Subjective:  Patient seen and examined. Lab Results  Component Value Date   PT 29.2 (H) 07/04/2014   PT 26.7 (H) 06/19/2014   PT 19.8 (H) 06/12/2014   INR 2.72 (H) 08/18/2018   INR 4.96 (H) 08/17/2018   INR 4.78 (H) 08/17/2018       Medications:    Current Facility-Administered Medications ([facility]     Gastroesophageal reflux disease     Dyslipidemia     ESRD on hemodialysis (HCC)     Orthostatic hypotension     Dependence on nocturnal oxygen therapy     Permanent atrial fibrillation (HCC)     Neuropathy involving both lower extremities     Med

## 2018-08-18 NOTE — PROGRESS NOTES
[facility]  Progress Note     Gertrudemegan Knightland Patient Status:  Inpatient    1945 MRN [id number]   [facility] 3NE-A Attending Shankar Casillas MD   Hosp Day # 2 PCP Juan Manuel Montalvo DO     Chief Complaint: HYPOTENSION    S: no c 08/16/18   1049   TROP  <0.046            Imaging: Imaging data reviewed in Epic.     Medications:   • Warfarin Sodium  5 mg Oral Nightly   • heparin sodium  1.5 mL Intravenous Once   • Normal Saline Flush  10 mL Intravenous Q12H   • allopurinol  100 mg Ora

## 2018-08-18 NOTE — CONSULTS
[facility]  Progress Note    Terrance Danni Myrna Patient Status:  Inpatient    1945 MRN [id number]   [facility] 3NE-A Attending Todd Castle MD   Hosp Day # 2 PCP Nany Urbina, DO       On bipap;   Tolerated HD yesterday w/ aid Appropriate mood and affect.     Recent Labs   08/16/18  1049 08/17/18  0659 08/17/18  1350 08/18/18  0523   WBC 8.3 6.3  --  8.0   HGB 10.7* 9.8*  --  10.1*   .9* 128.4*  --  122.6*   PLT 93.0* 87.0*  --  91.0*   INR 4.13* 4.78* 4.96* 2.72*

## 2018-08-18 NOTE — PLAN OF CARE
CARDIOVASCULAR - ADULT    • Maintains optimal cardiac output and hemodynamic stability Progressing    • Absence of cardiac arrhythmias or at baseline Progressing        HEMATOLOGIC - ADULT    • Maintains hematologic stability Progressing        Impaired Fu

## 2018-08-18 NOTE — PROGRESS NOTES
[facility]  Progress Note    Mona Fallon Patient Status:  Inpatient    1945 MRN [id number]   [facility] 4SW-A Attending Fredi Medellin MD   [facility] Day # 2 PCP Lena Lopez DO     Subjective:  Balbina Terry is a(n) 68 y 10.4*           Cultures: Blood cultures thus far negative    Radiology:  Reviewed with yesterday's film without evidence of focal infiltrates or evolving effusions      Medications reviewed     Assessment and Plan:   Patient Active Problem List:     Inell Juárez hypertensive pulmonary circuit-levo fed as weaning  Presumed ISABELLE-patient failed BiPAP therapy  and is now on AVAPS  Chronic atrial fibrillation  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

## 2018-08-18 NOTE — PLAN OF CARE
CARDIOVASCULAR - ADULT    • Maintains optimal cardiac output and hemodynamic stability Not Progressing        RESPIRATORY - ADULT    • Achieves optimal ventilation and oxygenation Not Progressing        SKIN/TISSUE INTEGRITY - ADULT    • Skin integrity rem

## 2018-08-19 NOTE — PLAN OF CARE
CARDIOVASCULAR - ADULT    • Maintains optimal cardiac output and hemodynamic stability Not Progressing          CARDIOVASCULAR - ADULT    • Absence of cardiac arrhythmias or at baseline Progressing        HEMATOLOGIC - ADULT    • Maintains hematologic stab

## 2018-08-19 NOTE — CONSULTS
[facility]  Progress Note    Sandra Fallon Patient Status:  Inpatient    1945 MRN [id number]   [facility] 3NE-A Attending Katrina Newell MD   [facility] Day # 3 PCP Ramana Pollock DO       No acute issues overnight  Respiratory st nontender, nondistended. Positive bowel sounds. No rebound tenderness   Neurologic: No focal deficits  Musculoskeletal: +le edema w/ chronic overlying skin changes  Integument: No lesions. No erythema. Psychiatric: Appropriate mood and affect.     Recent

## 2018-08-19 NOTE — PROGRESS NOTES
[facility]  Progress Note    [facility] Patient Status:  Inpatient    1945 MRN [id number]   [facility] 4SW-A Attending Phillip Porter MD   [facility] Day # 3 PCP Kat Leo DO     Subjective:  Brody Matt is a(n) 68 y Unspecified urinary incontinence     Morbid obesity (HCC)     Unspecified venous (peripheral) insufficiency     Osteoarthritis     Gout     Anemia     Right knee pain     At risk for falling     Atrial fibrillation (HCC)     ESRD (end stage renal disease) pulmonale  Severe obesity  End-stage renal disease on 3 times a week hemodialysis  Presumed ISABELLE-on nocturnal oxygen but no noninvasive positive pressure ventilation  History of peptic ulcer disease  History of hypothyroidism on repletion therapy      Add m

## 2018-08-19 NOTE — PROGRESS NOTES
[facility]  Progress Note    Clint Edilson Myrna Patient Status:  Inpatient    1945 MRN [id number]   [facility] 4SW-A Attending Geena Drake MD   [address] Day # 3 PCP Juma Morrison DO     Subjective:  Patient seen and examined. 08/19/2018   RDW 15.3 08/19/2018   PLT 78.0 08/19/2018       Lab Results  Component Value Date   PT 29.2 (H) 07/04/2014   PT 26.7 (H) 06/19/2014   PT 19.8 (H) 06/12/2014   INR 1.95 (H) 08/19/2018   INR 2.72 (H) 08/18/2018   INR 4.96 (H) 08/17/2018       Me BMI of 45.0-49.9, adult (HCC)     Bilateral edema of lower extremity     Unsteady gait     Generalized weakness     PUD (peptic ulcer disease)     Elevated serum protein level     Anticoagulated on warfarin     Abnormality of gait and mobility     Weakness

## 2018-08-19 NOTE — PLAN OF CARE
CARDIOVASCULAR - ADULT    • Maintains optimal cardiac output and hemodynamic stability Not Progressing          CARDIOVASCULAR - ADULT    • Absence of cardiac arrhythmias or at baseline Progressing        RESPIRATORY - ADULT    • Achieves optimal ventilati

## 2018-08-19 NOTE — PROGRESS NOTES
[facility]  Progress Note     Mabel Fallon Patient Status:  Inpatient    1945 MRN [id number]   [facility] 3NE-A Attending Lynn Villa MD   Hosp Day # 3 PCP Marge Garcia DO     Chief Complaint: tired    S: Patient is 0.5   --   0.9  0.9   TP  8.3   --   7.4  7.2       Estimated Creatinine Clearance: 9.4 mL/min (A) (based on SCr of 4.3 mg/dL (H)).     Recent Labs   Lab  08/17/18   1350  08/18/18   0523  08/19/18   0419   PTP  47.5*  29.7*  22.9*   INR  4.96*  2.72*  1.9 AM     Addendum:  Pt seen and examined , I agree with above. Pt awake, eating breakfast, comfortable. She is on low dose of pressor.     General: NAD  CVS: IR IR tachy   RS: diminished b/l  Legs: + lymphedema     Labs reviewed  Plan:  - scheduled midodr

## 2018-08-20 NOTE — PLAN OF CARE
CARDIOVASCULAR - ADULT    • Absence of cardiac arrhythmias or at baseline Progressing        RESPIRATORY - ADULT    • Achieves optimal ventilation and oxygenation Progressing        SKIN/TISSUE INTEGRITY - ADULT    • Skin integrity remains intact Progressi

## 2018-08-20 NOTE — PROGRESS NOTES
[facility]  Progress Note    [facility] Patient Status:  Inpatient    1945 MRN [id number]   [facility] 4SW-A Attending Gutierrez Bauer MD   [address] Day # 4 PCP Raford Cogan, DO     Subjective:  Ronan Couch is a(n) 68 y 2.56*  2.54*   HGB  10.1*  9.8*  9.7*   HCT  32.5*  31.4*  31.1*   MCV  122.6*  122.7*  122.4*   MCH  38.1*  38.3*  38.2*   MCHC  31.1  31.2  31.2   RDW  15.6  15.3  15.2   NEPRELIM  6.79*  4.33  5.50   WBC  8.0  5.4  6.6   PLT  91.0*  78.0*  87.0*     Rec Ascites was noted.     Impressions:  This study is compared with previous dated 07/27/2015:  Compared to the previous study, atrial fibrillation persists with a  ventricular response rate that has increased from 63-84 bpm to  bpm.  The above noted asc

## 2018-08-20 NOTE — PROGRESS NOTES
Cardiology Progress Note     PRIMARY CARDIOLOGIST: NAVNEET/NAZANIN      CONSULT FOR: RIGHT HEART FAILURE, MODERATE PULM HTN WITH PAS AT 50MMHG, NORMAL EF AT 75%,    AFIB ON RATE CONTRL AND ANTICOAG WITH COUMADIN TO INR OF 2-3  ISABELLE  AND RESPIRATORY DISTRESSS Clear to auscultation bilaterally. Chest wall:  No tenderness or deformity. Heart:  IRREG WITH INCREASED RATES, S1, S2 normal, no murmur, click, rub or gallop. Abdomen:   Soft, non-tender. Bowel sounds normal. No masses,  No organomegaly.  No abdomina 10/04/2011 154   06/28/2011 162   ----------  CHOLESTEROL (mg/dL)   Date Value   03/14/2013 108   11/12/2012 185   04/24/2012 182   ----------  Cholesterol, Total (mg/dL)   Date Value   06/15/2018 142   09/27/2017 107   03/03/2017 148   ----------  HDL C

## 2018-08-20 NOTE — PROGRESS NOTES
[facility]                INFECTIOUS DISEASE PROGRESS NOTE    Mona Fallon Patient Status:  Inpatient    1945 MRN [id number]   [facility] 4SW-A Attending Tere Bingham MD   Hosp Day # 4 PCP Lena Lopez, DO       Sub list reviewed:  Patient Active Problem List:     Unspecified urinary incontinence     Morbid obesity ([facility]     Unspecified venous (peripheral) insufficiency     Osteoarthritis     Gout     Anemia in ESRD (end-stage renal disease) (HCC)     Right knee pain Consultants  [phone]

## 2018-08-20 NOTE — PROGRESS NOTES
[facility]  Nephrology Progress Note    Max Mckeon Vasyl Patient Status:  Inpatient    1945 MRN [id number]   [facility] 4SW-A Attending Gray Peterson MD   [facility] Day # 4 PCP Lucila Lopez DO       SUBJECTIVE:  Remains on levo a 137  140  138   K  5.3*  5.0  4.0  3.5*  3.5*   CL  98*  98*  99*  101  102   CO2  28.0  30.0  27.0  30.0  26.0   BUN  48*  51*  27*  22*  32*   CREATSERUM  7.41*  7.58*  5.29*  4.30*  5.63*   CA  8.7  7.8*  7.9*  7.8*  7.8*   MG   --   2.2  2.0  1.9  1.7 to ESRD. Cont ESAs for goal hgb 10-11 gms    #3. SOB- related to CHF. Sig better after aggressive UF    #4. Hypotension- has chronic component for which she had been taking midodrine. Cont and follow.   Wean levo as able    Questions/concerns were disc

## 2018-08-20 NOTE — PROGRESS NOTES
[facility]  Progress Note     Dossie Janelle Fallon Patient Status:  Inpatient    1945 MRN [id number]   [facility] 3NE-A Attending Maciej Connolly MD   [facility] Day # 4 PCP Cass Thomson DO     Chief Complaint: tired    S: Patient re 8*  <6*   --    BILT  0.5   --   0.9  0.9   --    TP  8.3   --   7.4  7.2   --     < > = values in this interval not displayed. Estimated Creatinine Clearance: 7.1 mL/min (A) (based on SCr of 5.63 mg/dL (H)).     Recent Labs   Lab  08/17/18   1350  08    Quality:  · DVT Prophylaxis: coumadin daily INR  · CODE status: full  · Marie: none     Plan of care discussed with patient, RN Yonas BLACK  9:16 AM     Addendum:    Pt seen and examined, I agree with above.    Pt overall better, still

## 2018-08-20 NOTE — CM/SW NOTE
Responding to order for CM to assist with getting home AVAPS for patient. Met with pt and spouse at ICU bedside. Pt lives at home in [place] with her spouse in their single story home. Currently from home with [address]  Pt with maegan HERNANDEZ

## 2018-08-21 NOTE — PROGRESS NOTES
[facility]  Progress Note     [facility] Patient Status:  Inpatient    1945 MRN [id number]   [facility] 3NE-A Attending Ad Vargas MD   Hosp Day # 5 PCP Jennifer Cruz DO     Chief Complaint: tired    S: Patient re 27.0  30.0  26.0  26.0   ALKPHO  110   --   89  77   --    --    AST  17   --   12*  14*   --    --    ALT  15   --   8*  <6*   --    --    BILT  0.5   --   0.9  0.9   --    --    TP  8.3   --   7.4  7.2   --    --     < > = values in this interval not dis with chronic left leg wound   1. Off abx per ID, Wound care      Quality:  · DVT Prophylaxis: coumadin daily INR - 4.12 - hold tonight   · CODE status: full  · Marie: none     Plan of care discussed with patient, STACIE Lawrence.   T/f to floor after HD if BP trish

## 2018-08-21 NOTE — PHYSICAL THERAPY NOTE
PHYSICAL THERAPY EVALUATION - INPATIENT     Room Number: 457/457-A  Evaluation Date: 8/21/2018  Type of Evaluation: Initial  Physician Order: PT Eval and Treat    Presenting Problem: Septic shock; acute encephalopathy due to acute hypercapneic respirat for falling 7/26/2015   • Atrial fibrillation ([facility]    • Atrial fibrillation with controlled ventricular response ([facility] 11/22/2012   • At[facility] fibrillation with slow [facility] 7/27/2015   • Bilateral edema of lower extremity 1/16/2017   • C Patient is currently off of medication after being evaluated by the neurologist.    • Sleep apnea     pt states she wears a nasal cannula 2L   • Unsteady gait 1/16/2017   • Visual impairment        Past Surgical History  Past Surgical History:  MARCH 2013: approx'ly 50% of WFL due to morbid obesity and BLE lymphedema. Lower extremity strength is grossly 2+/5 at B hips and 3-/5 throughout rest of BLE's.      BALANCE  Static Sitting: Fair -  Dynamic Sitting: Poor  Static Standing: Not tested  Dynamic Standi functional ability scores are FIM defined scores per dept policy. Mobility as indicated above. VC's for expectations, slow control of exercises and movements, encouragement and to help with anxiety and fear of falling in egress position.      Pt sat EO KERRY;     The patient scores 0/20 on the Elderly Mobility Scale, indicating patient is dependent in terms of safe mobility.        Based on this evaluation, patient's clinical presentation is evolving and overall the evaluation complexity is considered moder

## 2018-08-21 NOTE — PROGRESS NOTES
[facility]  Progress Note    Clint Fallon Patient Status:  Inpatient    1945 MRN [id number]   [facility] 4SW-A Attending Noam Stearns MD   [address] Day # 5 PCP Juma Morrison DO     Subjective:  Toya Faustin is a(n) 68 y 7563  08/20/18   0449  08/20/18   0932  08/21/18   0601   RBC  2.65*  2.56*  2.54*  2.55*  2.37*   HGB  10.1*  9.8*  9.7*  9.9*  9.0*   HCT  32.5*  31.4*  31.1*  31.5*  28.6*   MCV  122.6*  122.7*  122.4*  123.5*  120.7*   MCH  38.1*  38.3*  38.2*  38.8* therapy     Continue to wean Levophed  Attempt to increase activity  Plan at this time to use AV APS only with sleep and as needed  Strive toward a negative fluid balance-patient's weight has been going up since admission and she remains quite edematous.

## 2018-08-21 NOTE — PROGRESS NOTES
[facility]  Nephrology Progress Note    Jocelyn Fallon Patient Status:  Inpatient    1945 MRN [id number]   [facility] 4SW-A Attending Kamran Bullock MD   [facility] Day # 5 PCP Jey Mancia DO       SUBJECTIVE:  Off levophed this 3237  08/18/18   0523  08/19/18   0419  08/20/18   0448  08/21/18   0601   NA  137  137  140  138  137   K  5.0  4.0  3.5*  3.5*  4.3   CL  98*  99*  101  102  101   CO2  30.0  27.0  30.0  26.0  26.0   BUN  51*  27*  22*  32*  44*   CREATSERUM  7.58*  5.29 routine    #2. Anemia - due to ESRD. Cont ESAs for goal hgb 10-11 gms    #3. SOB- related to CHF. Sig better after aggressive UF. Attempt 3-3.5 L UF today as tolerated. Consider extra UF tomorrow depending on volume status.     #4.  Hypotension- has c

## 2018-08-21 NOTE — OCCUPATIONAL THERAPY NOTE
OCCUPATIONAL THERAPY EVALUATION - INPATIENT     Room Number: 457/457-A  Evaluation Date: 8/21/2018  Type of Evaluation: Initial  Presenting Problem: Acute on Chronic CHF;  Sepsis, acute encephalopathy d/t hypercapnia; hypotension    Physician Order: IP Cons afib   • At risk for falling 7/26/2015   • Atrial fibrillation [facility])    • Atrial fibrillation with controlled ventricular response ([facility] 11/22/2012   • Atrial fibrillation with slow ventricular response ([facility] 7/27/2015   • Bilateral edema of lower extremit in question.   Patient is currently off of medication after being evaluated by the neurologist.    • Sleep apnea     pt states she wears a nasal cannula 2L   • Unsteady gait 1/16/2017   • Visual impairment        Past Surgical History  Past Surgical History self-stated goal is to not fall. OBJECTIVE  Precautions: Limb alert - right;Limb alert - left; Other (Comment) (LLE wound; BP on R forearm; HD(T/TH/Sat))  Fall Risk: High fall risk    WEIGHT BEARING RESTRICTION  Weight Bearing Restriction: None 66.57%  Standardized Score (AM-PAC Scale): 30.6  CMS Modifier (G-Code): CL    FUNCTIONAL TRANSFER ASSESSMENT  Supine to Sit : Dependent assistance  Sit to Stand: Not tested    Skilled Therapy Provided: Patient educated on OT role, goals, plan of care, jyoti RACQUEL-CHAD ' '6-Clicks' Inpatient Daily Activity Short Form was completed and  this patient  is demonstrating a  67% degree impairment in activities of daily living. Research supports that patients with this level of impairment often benefit from KERRY.     Modif Sub-acute rehabilitation (ELOS: 11-14 days)  OT Device Recommendations: TBD    PLAN  OT Treatment Plan: Balance activities; Energy conservation/work simplification techniques;ADL training;Functional transfer training;UE strengthening/ROM; Endurance training;

## 2018-08-21 NOTE — CM/SW NOTE
Spoke with  to make aware the [employer] not pt's home O2 provider. Mr. Dani Callahan contacted family at home and got phone # [phone] off the home O2 \"[employer]\" machine. CM called this number and it is [employer].  Spoke with [employer] rep Donna Ron who advises

## 2018-08-21 NOTE — PLAN OF CARE
Impaired Functional Mobility    • Achieve highest/safest level of mobility/gait Not Progressing        MUSCULOSKELETAL - ADULT    • Return mobility to safest level of function Not Progressing          CARDIOVASCULAR - ADULT    • Maintains optimal cardiac o

## 2018-08-21 NOTE — PROGRESS NOTES
[facility]  Progress Note    Cassia Fallon Patient Status:  Inpatient    1945 MRN [id number]   [facility] 4SW-A Attending Nixon Alston MD   [facility] Day # 5 PCP Ac Sutton DO     Subjective:  No new cardiac issues.   Sle hemodialysis ([facility]    Weakness    Lymphedema    Morbid obesity with BMI of 50.0-59.9, adult (HCC)    Acute hypercapnic respiratory failure (HCC)    Acute encephalopathy    Dyspnea    Acute on chronic diastolic (congestive) heart failure ([facility]    Shock ([facility]

## 2018-08-22 NOTE — BH PROGRESS NOTE
[address] [facility] Resource Referral Counselor Note    Gwendolyn Sandhya Norris Patient Status:  Inpatient    1945 MRN [id number]   [facility] 4SW-A Attending Jose C Pritchett MD   [facility] Day # 6 PCP DO KING Higuera(subjective) The

## 2018-08-22 NOTE — PHYSICAL THERAPY NOTE
Physical Therapy    Holding session today secondary to INR of 5.23, typically hold sessions w/ INR greater than 5.0 due to risk of bleeding. Pt recently completed HD, last bp reading was 74/57. Will reassess medical stability for session tomorrow.

## 2018-08-22 NOTE — PLAN OF CARE
CARDIOVASCULAR - ADULT    • Maintains optimal cardiac output and hemodynamic stability Not Progressing        Impaired Functional Mobility    • Achieve highest/safest level of mobility/gait Not Progressing        MUSCULOSKELETAL - ADULT    • Return mobilit

## 2018-08-22 NOTE — PROGRESS NOTES
[facility]  Progress Note     Phil Fallon Patient Status:  Inpatient    1945 MRN [id number]   [facility] 3NE-A Attending Aj Trejo MD   [address] Day # 6 PCP Narda Soliz DO     Chief Complaint: tired    S: Patient re 30.0  26.0  26.0  29.0   ALKPHO  110   --   89  77   --    --    --    AST  17   --   12*  14*   --    --    --    ALT  15   --   8*  <6*   --    --    --    BILT  0.5   --   0.9  0.9   --    --    --    TP  8.3   --   7.4  7.2   --    --    --     < > = chronic, decreased  13. Morbid obesity BMI 56  14. H/o c. difficile colitis in 2015  15. Hypomagnesia, replace per renal  16. Chronic lymphedema with chronic left leg wound   1.  Off abx per ID, Wound care      Quality:  · DVT Prophylaxis: coumadin daily IN

## 2018-08-22 NOTE — PROGRESS NOTES
[facility]  Nephrology Progress Note    Clint Fallon Patient Status:  Inpatient    1945 MRN [id number]   [facility] 4SW-A Attending Noam Stearns MD   [facility] Day # 6 PCP Juma Morrison DO       SUBJECTIVE:  No acute events, Recent Labs   Lab  08/18/18   0523  08/19/18   0419  08/20/18   0448  08/21/18   0601  08/22/18   0510   NA  137  140  138  137  137   K  4.0  3.5*  3.5*  4.3  3.9   CL  99*  101  102  101  100*   CO2  27.0  30.0  26.0  26.0  29.0   BUN  27*  22*  32 gms    #3. SOB- related to CHF. Sig better after aggressive UF. Tolerated 3.5 L Uf yesterday and wt sig better although when compared to wt from June she would appear to be approx 15# higher. Will plan extra UF today then usual HD tomorrow    #4.   Hypo

## 2018-08-22 NOTE — CM/SW NOTE
Provided website information to pt for [facility] in [place] as another potential option for KERRY with AVAPS capability.   Donita García, 08/22/18, 4:21 PM

## 2018-08-22 NOTE — PROGRESS NOTES
[facility]  Progress Note    Shemar Fallon Patient Status:  Inpatient    1945 MRN [id number]   [facility] 4SW-A Attending Edel Farooq MD   [facility] Day # 6 PCP Mera Rao DO     Subjective:  Rina Chiu is a(n) 68 y extremity/dependent edema, no cyanosis   Neurological: Alert, interactive, no focal deficits    Lab Data Review:  Recent Labs   Lab  08/19/18   0419  08/20/18   0449  08/20/18   0932  08/21/18   0601  08/22/18   0510   RBC  2.56*  2.54*  2.55*  2.37*  2.52 a week hemodialysis  10. Clinical diagnosis of ISABELLE/obesity hypoventilation syndrome-on nocturnal oxygen but no noninvasive positive pressure ventilation  11. History of peptic ulcer disease  12. History of hypothyroidism on repletion therapy  13.  History c

## 2018-08-22 NOTE — PROGRESS NOTES
[facility]  Progress Note    Nicole Fallon Patient Status:  Inpatient    1945 MRN [id number]   [facility] 4SW-A Attending Gautam Molina MD   [facility] Day # 6 PCP Casey Estrada DO       Assessment and Plan:  Patient Active Prob severe ISABELLE, hypercarbia and respiratory failure, per Pulmonary. She is on chronic O2 with AVAPS at night and prn.   3. Normal LVSF with diastolic dysfunction/cor pulmonale/pulmonary hypertension. 4. Chronic hypotension- off pressors. Continue midodrine. 08/22/2018   HGB 9.7 08/22/2018   HCT 30.6 08/22/2018   PLT 81.0 08/22/2018   CREATSERUM 5.36 08/22/2018   BUN 32 08/22/2018    08/22/2018   K 3.9 08/22/2018    08/22/2018   CO2 29.0 08/22/2018   GLU 85 08/22/2018   CA 8.4 08/22/2018   INR 5.23

## 2018-08-23 NOTE — PROGRESS NOTES
[facility]  Progress Note     Cesar Klebersandhya Fallon Patient Status:  Inpatient    1945 MRN [id number]   [facility] 3NE-A Attending Shankar Casillas MD   [facility] Day # 7 PCP Juan Manuel Montalvo DO     Chief Complaint: tired    S: Patient re ALKPHO  89  77   --    --    --    --    AST  12*  14*   --    --    --    --    ALT  8*  <6*   --    --    --    --    BILT  0.9  0.9   --    --    --    --    TP  7.4  7.2   --    --    --    --     < > = values in this interval not displayed.        Es 2015  15. Hypomagnesia, replace per renal  16. Chronic lymphedema with chronic left leg wound   1.  Off abx per ID, Wound care      Quality:  · DVT Prophylaxis: coumadin daily INR - INR still elevated - cont hold  · CODE status: full  · Marie: none     Plan

## 2018-08-23 NOTE — PHYSICAL THERAPY NOTE
Physical Therapy    Attempted to treat pt today, currently is on dialysis. INR remains high at 5.17, will re-attempt tomorrow.

## 2018-08-23 NOTE — PROGRESS NOTES
[facility]  Progress Note    Igor Zac Fallon Patient Status:  Inpatient    1945 MRN [id number]   [facility] 4SW-A Attending Ilir Yeung MD   [facility] Day # 7 PCP Don Zuniga DO     Subjective:  Lucy Veenanasreen is a(n) 68 y DEV  High flow nasal cannula   THGB  9.8*         Cultures: MRSA positive C. difficile negative cultures reviewed    Radiology:  Chest x-rays reviewed yesterday's film with improved interstitial markings overall heart size remains elevated    Medications Plan:     1. Hypercarbic respiratory failure-acute on chronic.  May be at her new baseline-unsupported PaCO2 in the 50s  2. Cor pulmonale by history.   3. Hypotension in the face of fluid overload-likely to reflect cor pulmonale and poor left heart delivery

## 2018-08-23 NOTE — PLAN OF CARE
Pt a/o x4. On 2L NC, unable to wean this AM.  Afib on monitor, continue to hold coumadin r/t INR. Mildly hypotensive during dialysis, but responded well to albumin. Remains off levo. Tolerating renal diet. No BM. Anuric.   Up to chair with sling and li

## 2018-08-23 NOTE — PROGRESS NOTES
[facility]  Nephrology Progress Note    [facility] Patient Status:  Inpatient    1945 MRN [id number]   [facility] 4SW-A Attending Tanesha Alanis MD   [facility] Day # 7 PCP Kat Leo DO       SUBJECTIVE:  Feels somewhat be 1.95*   --    --   4.12*  5.23*  5.17*       Recent Labs   Lab  08/18/18   0523  08/19/18   0419  08/20/18   0448  08/21/18   0601  08/22/18   0510  08/23/18   0416   NA  137  140  138  137  137  136   K  4.0  3.5*  3.5*  4.3  3.9  3.8   CL  99*  101  102 cream  Topical PRN   Midodrine HCl (PROAMATINE) tab 10 mg 10 mg Oral PRN Dialysis         Impression/Plan:    #1. ESRD- cont HD TTHS per usual routine. Vol status overall much better. #2. Anemia - due to ESRD. Cont ESAs for goal hgb 10-11 gms    #3.

## 2018-08-23 NOTE — CM/SW NOTE
SW spoke to Amalia Hedrick at [facility]. They have accepted pt- family has toured. SW to follow up with pt and family to make sure they are ok with [facility] at [place].     Nancy Storey, 08/23/18, 3:48 PM

## 2018-08-23 NOTE — CM/SW NOTE
MARILUZ met with pt this am. She states she would like her  and son to tour [facility]. She oks MARILUZ to send referral there first.  [facility] referral sent to [place].   Nancy Storey, 08/23/18, 11:20 AM

## 2018-08-23 NOTE — PROGRESS NOTES
[facility]  Progress Note    Susi Boaz Fallon Patient Status:  Inpatient    1945 MRN [id number]   [facility] 4SW-A Attending Cheko Kolb MD   [facility] PCP Maribel Gottlieb,      Subjective:  Awake, alert, up in chair. failure, unspecified heart failure type ([facility]  Active Problems:    History of diabetes mellitus, type II    Mixed hyperlipidemia    [facility]    Weakness    Lymphedema    Morbid obesity with BMI of 50.0-59.9, adult ([facility]    Acute hypercapn

## 2018-08-23 NOTE — PROGRESS NOTES
08/23/18 1113   Clinical Encounter Type   Visited With Patient   Routine Visit Follow-up   Sacramental Encounters   Sacrament of Sick-Anointing Anointed   The patient was seen by Stefany Pham.  Received prayer, Scripture, support and [employer]

## 2018-08-23 NOTE — PLAN OF CARE
Assumed care of pt @1900; resting in bed. A&Ox4. Denies pain. Afebrile. Afib on monitor. Levo gtt titrated. AVAPs to sleep. Antifungal cream applied to reddened skin folds. Wound care completed.  @BS earlier; all updated on POC.  Will continue to mon

## 2018-08-24 NOTE — CM/SW NOTE
Met and spoke with pt and  at ICU bedside for ongoing discharge planning.  toured [facility] yesterday. [facility] accepted pt if they are pt's choice? Pt and  not yet willing to confirm that they want [facility].   asked for option lis

## 2018-08-24 NOTE — PHYSICAL THERAPY NOTE
PHYSICAL THERAPY TREATMENT NOTE - INPATIENT    Room Number: 457/457-A     Session: 1   Number of Visits to Meet Established Goals: 6    Presenting Problem: Septic shock; acute encephalopathy due to acute hypercapneic respiratory failure; acute right side fibrillation with controlled ventricular response ([facility] [address] 11/22/2012   • Atrial fibrillation with slow ventricular response ([facility] [address] 7/27/2015   • Bilateral edema of lower extremity 1/16/2017   • Cancer ([facility] [address]     skin cancer on both arm-it was removed.     • CHF neurologist.    • Sleep apnea     pt states she wears a nasal cannula 2L   • Unsteady gait 1/16/2017   • Visual impairment        Past Surgical History  Past Surgical History:  MARCH 2013: FEMUR FRACTURE SURGERY  10/15/2010: OTHER SURGICAL HISTORY      Com CM    FUNCTIONAL ABILITY STATUS  Gait Assessment   Gait Assistance: Dependent assistance (Based on FIM scale per dept protocol)  Distance (ft): 1 step backwards - 2x  Assistive Device: Rolling walker (Bariatric)  Pattern: Shuffle  Stoop/Curb Assistance: No ability to complete functional tasks. Pt will cont to benefit from skilled PT to assist pt in regaining, balance, strength, cardiopulmonary endurance to allow for improved bed mobility and short distance gait.     DISCHARGE RECOMMENDATIONS  PT Discharge Re

## 2018-08-24 NOTE — PLAN OF CARE
Pt a/o x4. On 2L NC, unable to wean this AM.  Afib on monitor, continue to hold coumadin r/t INR. Levo titrated to maintain SBP >90 or MAP >60. Midodrine increased per renal.  Tolerating renal diet. No BM. Anuric.   Up to chair with sling and lift, rodolfo

## 2018-08-24 NOTE — OCCUPATIONAL THERAPY NOTE
OCCUPATIONAL THERAPY TREATMENT NOTE - INPATIENT     Room Number: 457/457-A  Session: 1   Number of Visits to Meet Established Goals: 7    Presenting Problem: Acute on Chronic CHF;  Sepsis, acute encephalopathy d/t hypercapnia; hypotension    History related fibrillation with controlled ventricular response ([facility] [address] 11/22/2012   • Atrial fibrillation with slow ventricular response ([facility] [address] 7/27/2015   • Bilateral edema of lower extremity 1/16/2017   • Cancer ([facility] [address]     skin cancer on both arm-it was removed.     • CHF neurologist.    • Sleep apnea     pt states she wears a nasal cannula 2L   • Unsteady gait 1/16/2017   • Visual impairment        Past Surgical History  Past Surgical History:  MARCH 2013: FEMUR FRACTURE SURGERY  10/15/2010: OTHER SURGICAL HISTORY      Com scoot forward in chair d/t height. Once edge of chair, patient able to sit unsupported with BUE on chair arms. Patient tolerated unsupported sit x 5 min.   Sit to stand w/ patient leading movements, mod A of 2 on each side and BUE supported on walker once

## 2018-08-24 NOTE — PROGRESS NOTES
[facility]  Progress Note    Cassia Fallon Patient Status:  Inpatient    1945 MRN [id number]   [facility] 4SW-A Attending Lacie Diehl MD   [facility] Day # 8 PCP Ac Sutton,      Subjective:  Up in chair.   Eating ice ch History of diabetes mellitus, type II    Mixed hyperlipidemia    ESRD on hemodialysis ([facility] [address]    Weakness    Lymphedema    Morbid obesity with BMI of 50.0-59.9, adult ([facility] [address]    Acute hypercapnic respiratory failure (HCC)    Acute encephalopathy    Dyspnea

## 2018-08-24 NOTE — PROGRESS NOTES
[facility]  Progress Note    Shemar Fallon Patient Status:  Inpatient    1945 MRN [id number]   [facility] 4SW-A Attending Edel Farooq MD   [facility] Day # 8 PCP Mera Rao DO     Subjective:  Rina Chiu is a(n) 68 y 08/23/18   0416  08/24/18   0444   RBC  2.54*   < >  2.52*  2.42*  2.32*   HGB  9.7*   < >  9.7*  9.2*  8.9*   HCT  31.1*   < >  30.6*  29.3*  28.5*   MCV  122.4*   < >  121.4*  121.1*  122.8*   MCH  38.2*   < >  38.5*  38.0*  38.4*   MCHC  31.2   < >  31. syndrome-on nocturnal oxygen but no noninvasive positive pressure ventilation  10. History of peptic ulcer disease  11. History of hypothyroidism on repletion therapy  12.  History consistent with both benign positional vertigo and intention tremor        D

## 2018-08-24 NOTE — PROGRESS NOTES
[facility]  Nephrology Progress Note    Max Linda Fallon Patient Status:  Inpatient    1945 MRN [id number]   [facility] 4SW-A Attending Gray Peterson MD   [facility] Day [facility] 8 PCP Lucila Lopez DO       SUBJECTIVE:  Up in chair, feel --   4.12*  5.23*  5.17*  4.13*    < > = values in this interval not displayed.        Recent Labs   Lab  08/19/18   0419  08/20/18   0448  08/21/18   0601  08/22/18   0510  08/23/18   0416  08/24/18   0444   NA  140  138  137  137  136  137   K  3.5*  3.5* lidocaine-prilocaine (EMLA) cream  Topical PRN   Midodrine HCl (PROAMATINE) tab 10 mg 10 mg Oral PRN Dialysis         Impression/Plan:    #1. ESRD- cont HD TTHS per usual routine. Vol status overall much better. #2. Anemia - due to ESRD.   Cont ESAs

## 2018-08-25 NOTE — PROGRESS NOTES
[facility]  Nephrology Progress Note    Ethyl Claudia Eugeneland Patient Status:  Inpatient    1945 MRN [id number]   [facility] 4SW-A Attending Kadie Burrows MD   [address] Day # 9 PCP Rissa Ceron DO       SUBJECTIVE:  Dialysis note, of 08/20/18   0448  08/21/18   0601  08/22/18   0510  08/23/18   0416  08/24/18   0444  08/25/18   0430   NA  138  137  137  136  137  133*   K  3.5*  4.3  3.9  3.8  3.4*  3.7   CL  102  101  100*  100*  100*  98*   CO2  26.0  26.0  29.0  28.0  30.0  27.0   B Midodrine HCl (PROAMATINE) tab 10 mg 10 mg Oral PRN Dialysis         Impression/Plan:    #1. ESRD- cont HD TTHS per usual routine. Vol status overall much better. #2. Anemia - due to ESRD. Cont ESAs for goal hgb 10-11 gms    #3.   SOB- related to C

## 2018-08-25 NOTE — PLAN OF CARE
Pt a/o x4. On 3L NC, weaned to 1L. HD this morning, 3L off. Hypotensive during dialysis, but responded well to albumin and midodrine. Remains off levo. Afib on monitor. Plan to restart coumadin tonight. Tolerating renal diet. No BM. Anuric.   Up to c

## 2018-08-25 NOTE — PROGRESS NOTES
[facility]  Progress Note     Mabel Fallon Patient Status:  Inpatient    1945 MRN [id number]   [facility] 4SW-A Attending Lynn Villa MD   [facility] Day # 9 PCP Marge Garcia DO     Chief Complaint: awaiting HF this am --    --    --    --    BILT  0.9   --    --    --    --    TP  7.2   --    --    --    --     < > = values in this interval not displayed. Estimated Creatinine Clearance: 7.2 mL/min (A) (based on SCr of 5.62 mg/dL (H)).     Recent Labs   Lab  08/23/ per ID, Wound care      Quality:  · DVT Prophylaxis: coumadin daily INR   · CODE status: full  · Marie: none    Estimated date of discharge: TBD  Discharge is dependent on: course  At this point Ms. Chrissie Connolly is expected to be discharge to: [facility]

## 2018-08-25 NOTE — PROGRESS NOTES
[facility]  Progress Note    Nicole Fallon Patient Status:  Inpatient    1945 MRN [id number]   [facility] 4SW-A [facility] Casey Estrada [facility]     Subjective:  Anton Hinton is a(n) 68 y 122.4*   < >  121.4*  121.1*  122.8*  124.3*   MCH  38.2*   < >  38.5*  38.0*  38.4*  37.9*   MCHC  31.2   < >  31.7  31.4  31.2  30.5*   RDW  15.2   < >  15.0  14.8  14.7  14.5   NEPRELIM  5.50   --   5.01   --   3.04   --    WBC  6.6   < >  6.2  4.9  3.9 disease  11. History of hypothyroidism on repletion therapy  12. History consistent with both benign positional vertigo and intention tremor        Plan:  -HD today as per renal  -follow hemodynamics and if hypotensive, would consider giving albumin.  Will

## 2018-08-25 NOTE — PROGRESS NOTES
[facility]  Progress Note    Kerry Norris     Subjective:  No chest pain or shortness of breath. No dizziness. Just off levophed today. Home warfarin dose had been 5 mg x 6 nights.  7.5mg one night      Intake/Output:    Intake/Output Summary [facility] nebulizer solution 2.5 mg 2.5 mg Nebulization QID   Midodrine HCl (PROAMATINE) tab 15 mg 15 mg Oral TID   Albumin Human (ALBUMINAR) 25 % solution 100 mL 100 mL Intravenous PRN   Meclizine HCl (ANTIVERT) tab 12.5 mg 12.5 mg Oral TID PRN   metoprolol Tartrat

## 2018-08-25 NOTE — PLAN OF CARE
CARDIOVASCULAR - ADULT    • Maintains optimal cardiac output and hemodynamic stability Not Progressing          HEMATOLOGIC - ADULT    • Maintains hematologic stability Progressing        RESPIRATORY - ADULT    • Achieves optimal ventilation and oxygenatio

## 2018-08-26 NOTE — PROGRESS NOTES
Due Coumadin 1 mg given PO last night, repeat INR this AM checked. AM care done at 0530H, AVAPS off, O2 at 1L/NC High flow. No untoward events during the night. BP has been stable.

## 2018-08-26 NOTE — PROGRESS NOTES
[facility]  Progress Note    Dossie Janelle Fallon Patient Status:  Inpatient    1945 MRN [id number]   [facility] 4SW-A Attending Jacqueline Resendiz MD   [facility] Day # 10 PCP Cass Thomson DO     Subjective:  Geoff Brown is a(n) 55 MCV  121.4*   < >  122.8*  124.3*  124.8*   MCH  38.5*   < >  38.4*  37.9*  37.8*   MCHC  31.7   < >  31.2  30.5*  30.3*   RDW  15.0   < >  14.7  14.5  14.7   NEPRELIM  5.01   --   3.04   --   3.06   WBC  6.2   < >  3.9*  4.5  3.9*   PLT  81.0*   < >  69 benign positional vertigo and intention tremor        Plan:  -HD as per renal  -has remained stable x 24hours now off of vasopressors.  Remains on midodrine TID  -will plan to transfer to floor with telemetry pending agreement with other specialties  -monit

## 2018-08-26 NOTE — PROGRESS NOTES
[facility]  Progress Note     Jaquelin Fallon Patient Status:  Inpatient    1945 MRN [id number]   [facility] 4SW-A Attending Michelle Huerta MD   [facility] Day # 8 PCP Juancarlos Douglas DO     Chief Complaint: no complains    S: Pa Imaging: Imaging data reviewed in Epic.     Medications:   • albuterol sulfate  2.5 mg Nebulization QID   • Midodrine HCl  15 mg Oral TID   • metoprolol Tartrate  25 mg Oral 2x Daily(Beta Blocker)   • heparin sodium  1.5 mL Intravenous Once   • Normal S

## 2018-08-26 NOTE — PLAN OF CARE
Pt on 1L per NC while awake, states she wore AVAPS all noc. Remains off levo w/ SBP >85. Up in chair this morning. Physical therapy on consult but pt refusing to attempt to stand w/ nursing staff. Sling used to get pt up in recliner.  INR 1.96 today, coumad

## 2018-08-26 NOTE — PROGRESS NOTES
[facility]  Nephrology Progress Note    Corrinne Christen Richardland Patient Status:  Inpatient    1945 MRN [id number]   [facility] 4SW-A Attending Armand Ballesteros MD   [facility] Day # 10 PCP Bhumika Rivera, DO       SUBJECTIVE:  Has been stable. 1.96*       Recent Labs   Lab  08/20/18   0448  08/21/18   0601  08/22/18   0510  08/23/18   0416  08/24/18   0444  08/25/18   0430  08/26/18   0522   NA  138  137  137  136  137  133*  134*   K  3.5*  4.3  3.9  3.8  3.4*  3.7  4.0   CL  102  101  100*  10 Dialysis         Impression/Plan:    #1. ESRD- cont HD TTHS per usual routine. Vol status overall much better. #2. Anemia - due to ESRD. Cont ESAs for goal hgb 10-11 gms    #3. SOB- related to CHF.   Sig better after aggressive UF.  pulm following a

## 2018-08-26 NOTE — PROGRESS NOTES
Problem: Fluid overload    Data: Patient arrived to room from ICU. Patient alert and oriented. Patient denies pain,. SPO2 remains greater then 92% on room air. Lung sounds diminished. No cough noted. Blood pressure remains within patient defined limits.  Mi

## 2018-08-27 NOTE — PLAN OF CARE
Pt took off resp mask and called c/o nausea, afraid of vomiting. Emesis basis given. 1L nc applied. PRN nausea medication ordered per MD and given. Will continue to monitor.

## 2018-08-27 NOTE — PROGRESS NOTES
[facility]  Nephrology Progress Note    Luis Enriqueglenroy Knightcandace Patient Status:  Inpatient    1945 MRN [id number]   [facility] 4SW-A Attending Aliya Vigil MD   [facility] Day # 11 PCP Claire Eddy,        SUBJECTIVE:  BP low but at ba 9.0*   --    MCV  121.4*  121.1*  122.8*  124.3*  124.8*   --    PLT  81.0*  73.0*  69.0*  86.0*  80.0*   --    INR  5.23*  5.17*  4.13*  2.88*  1.96*  1.73*       Recent Labs   Lab  08/21/18   0601  08/22/18   0510  08/23/18   0416  08/24/18   0444  08/25 mg 10 mg Oral PRN Dialysis         Impression/Plan:    #1. ESRD- cont HD TTHS per usual routine. Vol status overall much better. #2. Anemia - due to ESRD. Cont ESAs for goal hgb 10-11 gms    #3. SOB- related to CHF. Improved with aggressive UF.   O

## 2018-08-27 NOTE — PROGRESS NOTES
[facility]  Cardiology Progress Note    Igorpauly Frank Myrna Patient Status:  Inpatient    1945 MRN [id number]   [facility] 5NW-A Attending Bob Sanches MD   [facility] Day # 6 PCP Don Zuniga DO     Subjective:  Feeling much virginia mg Oral QAM AC         Assessment:  · Volume overload, due to stopping HD for Hypotension - improved with UF  · Acute on chronic hypercapnia - per pulmonary  · Hypotension - improved with midodrine  · Pulmonary HTN, normal LVEF  · ESRD on HD  · Lymphedema

## 2018-08-27 NOTE — PROGRESS NOTES
[facility]  Progress Note    Shane Fallon Patient Status:  Inpatient    1945 MRN [id number]   [facility] 5NW-A Attending Brian Mullen MD   [facility] Day # 6 PCP Bernadine Cai DO     Subjective:  Luis F Fuentes is a(n) 89 Lab  08/22/18   0510   08/24/18   0444  08/25/18   0430  08/26/18   0522   RBC  2.52*   < >  2.32*  2.43*  2.38*   HGB  9.7*   < >  8.9*  9.2*  9.0*   HCT  30.6*   < >  28.5*  30.2*  29.7*   MCV  121.4*   < >  122.8*  124.3*  124.8*   MCH  38.5*   < >  3 nocturnal oxygen but no noninvasive positive pressure ventilation  10. History of peptic ulcer disease  11. History of hypothyroidism on repletion therapy  12.  History consistent with both benign positional vertigo and intention tremor        Plan:  -HD as

## 2018-08-27 NOTE — PROGRESS NOTES
[facility]  Progress Note     Zac Marcus [facility] Patient Status:  Inpatient    1945 MRN [id number]   [place] [facility] 4SW-A Attending Ad Vargas MD   [address] Day # 6 PCP Jennifer Cruz DO     Chief Complaint: no complains    S: Pa input(s): TROP, CK in the last 168 hours. Imaging: Imaging data reviewed in Epic.     Medications:   • [START ON 8/28/2018] epoetin ever  10,000 Units Intravenous Once in dialysis   • albuterol sulfate  2.5 mg Nebulization QID   • Midodrine HCl  15

## 2018-08-28 NOTE — OCCUPATIONAL THERAPY NOTE
Attempted to see pt. Pt t/f to medical floor on 8/26/18. Transfer back to ICU on 8/28/18 in early am due to hypotension. Pt will need new orders to resume inpt PT/OT when appropriate.   Will check in with nursing later in the day

## 2018-08-28 NOTE — PROGRESS NOTES
[facility]  Progress Note    Mona Shin Fallon Patient Status:  Inpatient    1945 MRN [id number]   [facility] 4SW-A Attending Altagracia Landis MD   [facility] Day # 15 PCP Lena Lopez DO     Subjective:  Events of last night noted hemodialysis ([facility] [address]    Weakness    Lymphedema    Morbid obesity with BMI of 50.0-59.9, adult (HCC)    Acute hypercapnic respiratory failure (HCC)    Acute encephalopathy    Dyspnea    Acute on chronic diastolic (congestive) heart failure ([facility] [address]    Shock ([facility] [address]

## 2018-08-28 NOTE — PHYSICAL THERAPY NOTE
PHYSICAL THERAPY RE-EVALUATION - INPATIENT     Room Number: 467/467-A  Evaluation Date: 8/28/2018  Type of Evaluation: Re-evaluation  Physician Order: PT Eval and Treat    Presenting Problem: Septic shock; acute encephalopathy due to acute hypercapneic ([facility] [address]    Shock ([facility] [address]    Thrombocytopenia ([facility] [address]    Hypomagnesemia    Supratherapeutic INR      Past Medical History  Past Medical History:   Diagnosis Date   • Abnormality of gait and mobility 6/17/2017   • Anemia    • Anticoagulated on warfarin 4/23/2017 1, 2013    • Pulmonary hypertension (HCC)    • PVD [facility]peripheral [facility]) [facility]    • RBBB    • Renal failure (ARF), acute on chronic ([facility] 10/5/2013   • Respiratory failure ([facility]    • Seizure disorder ([facility]    • Seizure disorder ([facility] 6/25/2016    T High fall risk    WEIGHT BEARING RESTRICTION  Weight Bearing Restriction: None                PAIN ASSESSMENT  Ratin  Location: denies at this time  Management Techniques: Repositioning    COGNITION  · Arousal/Alertness:  appropriate responses to stimu Score: 100%   Standardized Score (AM-PAC Scale): 23.55   CMS Modifier (G-Code): CN    FUNCTIONAL ABILITY STATUS  Gait Assessment   Gait Assistance: Not tested ( )  Distance (ft): 0  Assistive Device: None ( )  Pattern: Comment (na)  Stoop/Curb Assistance: staff;Up in chair;Call light within reach;RN aware of session/findings; All patient questions and concerns addressed (nurse present)    Eval completed. ASSESSMENT     Patient is a 68year old female admitted on 8/16/2018 for presenting problems above.   P move legs on the bed in SLR and hip abd/add. Goal #4 Pt is able to hold LAQ on each leg for 15 seconds.     Goal #5    Goal #6    Goal Comments: Goals established on 8/28/2018

## 2018-08-28 NOTE — PROGRESS NOTES
[facility]  Progress Note    Corrinne Christen Richardland Patient Status:  Inpatient    1945 MRN [id number]   [facility] 4SW-A Attending Manny Vazquez MD   [facility] Day # 12 PCP Bhumika Rivera, DO     STATUS UPDATE: Over the course of the [facility] cyanosis   Neurological: Alert, interactive, no focal deficits    Lab Data Review:  Recent Labs   Lab  08/22/18   0510   08/24/18   0444  08/25/18   0430  08/26/18   0522  08/28/18   0435   RBC  2.52*   < >  2.32*  2.43*  2.38*  2.55*   HGB  9.7*   < >  8. fibrillation-controlled rate   6. 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 Severe obesity  8. End-stage renal disease on 3 times a week hemodialysis  9.  Clinical diagnosis of ISABELLE/obesit

## 2018-08-28 NOTE — PROGRESS NOTES
Patient hypotensive tonight, BP 82/49    Chart reviewed    Last HD was Saturday    Gave 250 cc bolus NS and repeat BP 79/52     Will transfer to ICU, will need to start levophed    Has cor pulmonale / pulmonary hypertension and was volume overloaded despit

## 2018-08-28 NOTE — PROGRESS NOTES
08/28/18 0056   Provider Notification   Reason for Communication Review case  (BP still 79/52)   Provider Name Other (comment)  (Dr. Rigoberto Beyer)   Method of Communication Page   Response Waiting for response   Notification Time      MD notified of BP o

## 2018-08-28 NOTE — PLAN OF CARE
CARDIOVASCULAR - ADULT    • Maintains optimal cardiac output and hemodynamic stability Not Progressing          RESPIRATORY - ADULT    • Achieves optimal ventilation and oxygenation Progressing        SKIN/TISSUE INTEGRITY - ADULT    • Skin integrity remai

## 2018-08-28 NOTE — RESPIRATORY THERAPY NOTE
Arterial line placed per MD order  Right Radial  Threaded with no resistance  Good wave form and good draw back from line

## 2018-08-28 NOTE — PHYSICAL THERAPY NOTE
Attempted to see pt. Pt t/f to medical floor on 8/26/18. Transfer back to ICU on 8/28/18 in early am due to hypotension. Pt will need new orders to resume inpt PT/OT when appropriate. Will check in with nursing later in the day.

## 2018-08-28 NOTE — PROGRESS NOTES
Pt with low BP while on the floor. Given a total of 500 ml bolus. HD last done on Saturday, due today. Transferred to ICU for Levophed gtt. Maintain BP above 90 and MAP above 60.    Breathing easy on AVAPS, lungs diminished, oriented x 2, ANGELES to comma

## 2018-08-28 NOTE — PROGRESS NOTES
[facility]  Progress Note     Dossie Janelle Fallon Patient Status:  Inpatient    1945 MRN [id number]   [facility] 4SW-A Attending Maciej Connolly MD   [facility] Day # 15 PCP Cass Thomson DO     Chief Complaint: no complains    S: Pa (H)).    Recent Labs   Lab  08/25/18   0430  08/26/18   0522  08/27/18   0725   PTP  31.1*  23.0*  20.9*   INR  2.88*  1.96*  1.73*       No results for input(s): TROP, CK in the last 168 hours. Imaging: Imaging data reviewed in Epic.     Medication Prophylaxis: coumadin daily INR   · CODE status: full  · Marie: none     Estimated date of discharge: TBD  Discharge is dependent on: course  At this point Ms. Augustine Parrish is expected to be discharge to: [address] of care discussed with patient.   Katie Wolf

## 2018-08-28 NOTE — PROGRESS NOTES
[facility]  Nephrology Progress Note    Jaquelin Fallon Patient Status:  Inpatient    1945 MRN [id number]   [facility] 4SW-A Attending Michelle Huerta MD   [facility] Day # 12 PCP Juancarlos Douglas DO       SUBJECTIVE:  Transferred to  9.2*  8.9*  9.2*  9.0*   --   9.8*   MCV  121.1*  122.8*  124.3*  124.8*   --   123.9*   PLT  73.0*  69.0*  86.0*  80.0*   --   93.0*   INR  5.17*  4.13*  2.88*  1.96*  1.73*   --        Recent Labs   Lab  08/22/18   0510  08/23/18   0416  08/24/18   0444 Sodium (SYNTHROID, LEVOTHROID) tab 125 mcg 125 mcg Oral Before breakfast   Pantoprazole Sodium (PROTONIX) EC tab 20 mg 20 mg Oral QAM AC   lidocaine-prilocaine (EMLA) cream  Topical PRN   Midodrine HCl (PROAMATINE) tab 10 mg 10 mg Oral PRN Dialysis

## 2018-08-29 NOTE — OCCUPATIONAL THERAPY NOTE
OCCUPATIONAL THERAPY                  OT re-eval attempted, however STACIE Dunn requesting to wait for now as patient was just assisted back to bed after sitting up two hours and is fatigued. Will re-attempt later today as time permits.

## 2018-08-29 NOTE — PLAN OF CARE
CARDIOVASCULAR - ADULT    • Maintains optimal cardiac output and hemodynamic stability Not Progressing          RESPIRATORY - ADULT    • Achieves optimal ventilation and oxygenation Progressing        SKIN/TISSUE INTEGRITY - ADULT    • Incision(s), wounds(

## 2018-08-29 NOTE — PROGRESS NOTES
[facility]  Progress Note    Max Linda Fallon Patient Status:  Inpatient    1945 MRN [id number]   [facility] 4SW-A Attending Gray Peterson MD   [facility] Day # 15 PCP Lucila Lopez DO     Subjective:  Sbaine Tidwell is a(n) 31 Alert, interactive, no focal deficits    Lab Data Review:  Recent Labs   Lab  08/24/18   0444   08/26/18   0522  08/28/18   0435  08/29/18   0450   RBC  2.32*   < >  2.38*  2.55*  2.53*   HGB  8.9*   < >  9.0*  9.8*  9.5*   HCT  28.5*   < >  29.7*  31.6* above-Coumadin currently on hold, following daily INR  7. Severe obesity  8. End-stage renal disease on 3 times a week hemodialysis  9.  Clinical diagnosis of ISABELLE/obesity hypoventilation syndrome-on nocturnal oxygen but no noninvasive positive pressure vent

## 2018-08-29 NOTE — PROGRESS NOTES
[facility]PITAL[facility]  Progress Note     Cassia Knightland Patient Status:  Inpatient    1945 MRN [id number]   [facility] 4SW-A Attending Nixon Alston MD   [facility] Day # 15 PCP Ac Sutton DO     Chief Complaint: no complains    S: Pa 8.3   < >  8.8  8.9  8.4   ALB  2.8*   --    --    --    --    NA  136   < >  134*  131*  132*   K  3.8   < >  4.0  4.7  4.6   CL  100*   < >  100*  96*  98*   CO2  28.0   < >  28.0  27.0  28.0    < > = values in this interval not displayed.        Estimate stable  13. Morbid obesity BMI 56  14. H/o c. difficile colitis in 2015  15. Hypomagnesia, improved  16. Chronic lymphedema with chronic left leg wound   1.  Off abx per ID, Wound care     Quality:  · DVT Prophylaxis: coumadin daily INR - 1.9 today  · CODE

## 2018-08-29 NOTE — PROGRESS NOTES
[facility]  Progress Note    Igormary Fallon Patient Status:  Inpatient    1945 MRN [id number]   [facility] 4SW-A Attending Bob Sanches MD   [facility] Day # 15 PCP Don Zuniga DO     Subjective:  Transferred out of ICU.   Yamileth Granados Mixed hyperlipidemia    ESRD on hemodialysis ([facility] [address]    Weakness    Lymphedema    Morbid obesity with BMI of 50.0-59.9, adult (HCC)    Acute hypercapnic respiratory failure (HCC)    Acute encephalopathy    Dyspnea    Acute on chronic diastolic (congestive) h

## 2018-08-29 NOTE — PLAN OF CARE
CARDIOVASCULAR - ADULT    • Absence of cardiac arrhythmias or at baseline Progressing        HEMATOLOGIC - ADULT    • Maintains hematologic stability Progressing        Impaired Activities of Daily Living    • Achieve highest/safest level of independence i

## 2018-08-29 NOTE — PROGRESS NOTES
[facility]  Nephrology Progress Note    Annamariaanthony Fallon Patient Status:  Inpatient    1945 MRN [id number]   [facility] 4SW-A Attending Kym Felix MD   [facility] Day # 15 PCP Rogerio Santiago DO       SUBJECTIVE:  BP has been rela 3. 9*  4.5  3.9*   --   6.4   --   4.7   --    HGB  8.9*  9.2*  9.0*   --   9.8*   --   9.5*   --    MCV  122.8*  124.3*  124.8*   --   123.9*   --   123.7*   --    PLT  69.0*  86.0*  80.0*   --   93.0*   --   89.0*   --    INR  4.13*  2.88*  1.96*  1.73* allopurinol (ZYLOPRIM) tab 100 mg 100 mg Oral Daily   gabapentin (NEURONTIN) cap 300 mg 300 mg Oral Nightly   Levothyroxine Sodium (SYNTHROID, LEVOTHROID) tab 125 mcg 125 mcg Oral Before breakfast   Pantoprazole Sodium (PROTONIX) EC tab 20 mg 20 mg Oral

## 2018-08-30 NOTE — PROGRESS NOTES
[facility]  Nephrology Progress Note    [facility] Patient Status:  Inpatient    1945 MRN [id number]   [facility] 4SW-A Attending Tanesha Alanis MD   [facility] Day # 15 PCP Kat Leo DO       SUBJECTIVE:  Pt seen on dialy 08/29/18   0450  08/29/18   0457  08/30/18   0425   WBC  3.9*  4.5  3.9*   --   6.4   --   4.7   --    --    HGB  8.9*  9.2*  9.0*   --   9.8*   --   9.5*   --    --    MCV  122.8*  124.3*  124.8*   --   123.9*   --   123.7*   --    --    PLT  69.0*  86.0* injection 10 mL 10 mL Intravenous PRN   acetaminophen (TYLENOL) tab 650 mg 650 mg Oral Q6H PRN   allopurinol (ZYLOPRIM) tab 100 mg 100 mg Oral Daily   gabapentin (NEURONTIN) cap 300 mg 300 mg Oral Nightly   Levothyroxine Sodium (SYNTHROID, LEVOTHROID) tab

## 2018-08-30 NOTE — PROGRESS NOTES
Multidisciplinary Discharge Rounds held 8/30/2018. Treatment team members present today include , , Charge Nurse,  Nurse, RT, PT and Pharmacy caring for [employer].      Other care providers present:    Patient Active Prob Anticipated discharge date: TBD    Current discharge plan: KERRY? Pt discussed and identified as a high risk for readmission.

## 2018-08-30 NOTE — OCCUPATIONAL THERAPY NOTE
Attempted to see pt this PM, pt just received lunch and refusing to work with therapy at this time, OT will follow up later as schedule permits.

## 2018-08-30 NOTE — PROGRESS NOTES
[facility]  Progress Note    Khushbu Kanner Myrna Patient Status:  Inpatient    1945 MRN [id number]   [facility] 4SW-A Attending Reji Osorio MD   [facility] Day # 15 PCP Ashanti Tinsley DO     Subjective:  Prema Banks is a 68 yea 08/29/18   0450   RBC  2.32*   < >  2.38*  2.55*  2.53*   HGB  8.9*   < >  9.0*  9.8*  9.5*   HCT  28.5*   < >  29.7*  31.6*  31.3*   MCV  122.8*   < >  124.8*  123.9*  123.7*   MCH  38.4*   < >  37.8*  38.4*  37.5*   MCHC  31.2   < >  30.3*  31.0  30.4* pressure ventilation  10. History of peptic ulcer disease  11. History of hypothyroidism on repletion therapy  12.  History consistent with both benign positional vertigo and intention tremor        Plan:  -HD as per renal  Continue ProAmatine and Florinef

## 2018-08-30 NOTE — PROGRESS NOTES
[facility]  Progress Note     Jocelyn Knightland Patient Status:  Inpatient    1945 MRN [id number]   [facility] 4SW-A Attending Kamran Bullock MD   [facility] Day # 15 PCP Jey Mancia DO     Chief Complaint: no complains    S: Pa (H)).    Recent Labs   Lab  08/28/18   1209  08/29/18   0457  08/30/18   0425   PTP  20.7*  22.5*  23.3*   INR  1.71*  1.90*  1.99*       No results for input(s): TROP, CK in the last 168 hours. Imaging: Imaging data reviewed in Epic.     Medication consultants. Case d/w patient, RN. Aidan BLACK  9:29 AM        Addendum:     Pt seen and examined.  I agree with above.      General: NAD  CVS: IR IR   RS: CTAB  Abd: soft NT ND  Ext: lymphedema      Labs reviewed     Plan:  - cont HD, monit

## 2018-08-30 NOTE — PROGRESS NOTES
[facility]  Cardiology Progress Note    Igor Bartlettblake Norris Patient Status:  Inpatient    1945 MRN [id number]   [facility] 5NW-A Attending Brynn Conde MD   [facility] Day # 15 PCP Don Zuniga DO     Subjective:  Getting HD current 1.99     Plan:   1. Continue midodrine, digoxin  2. Continue coumadin  3. Hopefully to KERRY soon    KONRAD Smith  8/30/2018  11:28 AM    Seen and examined. Agree as amended. D/W pt and .       Woo Lester MD

## 2018-08-31 NOTE — PROGRESS NOTES
[facility]  Cardiology Progress Note    Josh Norris Patient Status:  Inpatient    1945 MRN [id number]   [facility] 5NW-A Attending Aliya Vigil MD   [facility] Day # 15 PCP Claire Eddy DO     Subjective:  Patient complains 10 mL Intravenous Q12H   • allopurinol  100 mg Oral Daily   • gabapentin  300 mg Oral Nightly   • Levothyroxine Sodium  125 mcg Oral Before breakfast   • Pantoprazole Sodium  20 mg Oral QAM AC         Assessment:  · Volume overload, due to stopping HD for

## 2018-08-31 NOTE — PROGRESS NOTES
[facility]  Nephrology Progress Note    Sandra Fallon Patient Status:  Inpatient    1945 MRN [id number]   [facility] 4SW-A Attending Katrina Newell MD   [address] Day # 13 PCP Ramana Pollock DO       SUBJECTIVE:  Pt c/o HA mary A 08/27/18   0725  08/28/18   0435  08/28/18   1209  08/29/18   0450  08/29/18   0457  08/30/18   0425  08/31/18   0456   WBC  4.5  3.9*   --   6.4   --   4.7   --    --    --    HGB  9.2*  9.0*   --   9.8*   --   9.5*   --    --    --    MCV  124.3*  124.8* allopurinol (ZYLOPRIM) tab 100 mg 100 mg Oral Daily   gabapentin (NEURONTIN) cap 300 mg 300 mg Oral Nightly   Levothyroxine Sodium (SYNTHROID, LEVOTHROID) tab 125 mcg 125 mcg Oral Before breakfast   Pantoprazole Sodium (PROTONIX) EC tab 20 mg 20 mg Oral

## 2018-08-31 NOTE — PROGRESS NOTES
[facility]  Progress Note    Max Fuelling Myrna Patient Status:  Inpatient    1945 MRN [id number]   [facility] 5NW-A Attending Gray Peterson MD   [facility] Day # 15 PCP Lucila Lopez DO     Subjective:  Sabine Tidwell is a(n) 79 MCV  124.8*  123.9*  123.7*   MCH  37.8*  38.4*  37.5*   MCHC  30.3*  31.0  30.4*   RDW  14.7  14.5  14.3   NEPRELIM  3.06   --   3.79   WBC  3.9*  6.4  4.7   PLT  80.0*  93.0*  89.0*     Recent Labs   Lab  08/26/18   0522  08/28/18   0435  08/29/18   04 fluid balance and weights  -continue PT, ambulate/out of bed as tolerated  -follow INR  -Continues to use and benefit from nocturnal positive pressure ventilation with plans to continue  -Continue AVAPS at night with goal of increasing tolerance    Discuss

## 2018-08-31 NOTE — OCCUPATIONAL THERAPY NOTE
OCCUPATIONAL THERAPY EVALUATION - INPATIENT     Room Number: 825/569-C  Evaluation Date: 8/31/2018  Type of Evaluation: Re-evaluation  Presenting Problem: Acute on Chronic CHF;  Sepsis, acute encephalopathy d/t hypercapnia; hypotension    Physician Order: I fibrillation with controlled ventricular response ([facility] [address] 11/22/2012   • Atrial fibrillation with slow ventricular response ([facility] [address] 7/27/2015   • Bilateral edema of lower extremity 1/16/2017   • Cancer ([facility] [address]     skin cancer on both arm-it was removed.     • CHF neurologist.    • Sleep apnea     pt states she wears a nasal cannula 2L   • Unsteady gait 1/16/2017   • Visual impairment        Past Surgical History  Past Surgical History:  MARCH 2013: FEMUR FRACTURE SURGERY  10/15/2010: OTHER SURGICAL HISTORY      Com alert - right;Limb alert - left; Other (Comment) (LLE wound; BP on R forearm; HD(T/TH/Sat))  Fall Risk: High fall risk    WEIGHT BEARING RESTRICTION  Weight Bearing Restriction: None                PAIN ASSESSMENT  Rating: Unable to rate  Location: unknown regular upper body clothing?: A Lot  -   Taking care of personal grooming such as brushing teeth?: A Lot  -   Eating meals?: A Little    AM-PAC Score:  Score: 10  Approx Degree of Impairment: 74.7%  Standardized Score (AM-PAC Scale): 27.31  CMS Modifier (G comorbidities nor modifications of tasks    Clinical Decision Making LOW - Analysis of occupational profile, problem-focused assessments, limited treatment options    Overall Complexity LOW     OT Discharge Recommendations: Sub-acute rehabilitation (ELOS:

## 2018-08-31 NOTE — CM/SW NOTE
Per [facility], patient will need Hepatitis B surface antibody and core antibody drawn for dialysis on discharge. The  will call tomorrow for results. Dr. Dai Lacks requesting verification of AVAPS @ [facility] for discharge.  Per Paco Lobato, he valentin

## 2018-08-31 NOTE — PHYSICAL THERAPY NOTE
PHYSICAL THERAPY TREATMENT NOTE - INPATIENT    Room Number: 846/166-Y     Session: 2   Number of Visits to Meet Established Goals: 6    Presenting Problem: Septic shock; acute encephalopathy due to acute hypercapneic respiratory failure; acute right side 6/25/2016   • Lymphedema     BILAT LOWER EXTREMITES   • Methicillin resistant Staphylococcus aureus in conditions classified elsewhere and of unspecified site 6/29/2012   • Mixed hyperlipidemia 6/25/2016   • Morbid obesity with BMI of 50.0-59.9, adult ([facility] Static Sitting: Poor -  Dynamic Sitting: Dependent           Static Standing: Not tested  Dynamic Standing: Not tested    ACTIVITY TOLERANCE  SpO2 88-95%, 273RXH    AM-PAC '6-Clicks Discharge Recommendations: Sub-acute rehabilitation (ELOS = 11-14 days)     PLAN  PT Treatment Plan: Bed mobility; Patient education; Family education;Strengthening;Transfer training;Balance training  Rehab Potential : Guarded  Frequency (Obs): 5x/week  CURR

## 2018-08-31 NOTE — PROGRESS NOTES
[facility]  Progress Note     Phil Fallon Patient Status:  Inpatient    1945 MRN [id number]   [facility] 4SW-A Attending Aj Trejo MD   [facility] Day # 13 PCP Narda Soliz DO     Chief Complaint: no complains    S: Pa mL/min (A) (based on SCr of 4.88 mg/dL (H)). Recent Labs   Lab  08/29/18   0457  08/30/18   0425  08/31/18   0456   PTP  22.5*  23.3*  24.1*   INR  1.90*  1.99*  2.08*       No results for input(s): TROP, CK in the last 168 hours.          Imaging: Imagi patient, RN. D/c planning. Ramez BLACK  9:06 AM        Addendum:    Pt seen and examined. I agree with above. General: NAD  CVS: IR IR   RS: CTAB  Ext: chronic edema     DC planning.      Meghan Duke MD

## 2018-09-01 NOTE — CM/SW NOTE
DAQUAN has attempted over the past three hours to speak with someone at [facility] who was able to confirm that an [facility] [address] is in the facility for the patient with the accurate settings.   After numerous attempts, DAQUAN was able to speak with Lukas esquivel

## 2018-09-01 NOTE — PROGRESS NOTES
[facility]  Nephrology Progress Note    Khoa Fallon Attending:  Alexis Allen MD       Assessment and Plan:    1) ESRD- HD today per usual routine; fluid overload much improved; mild residual edema without CHF    2) Anemia- due to ESRD; on EPO Albumin Human (ALBUMINAR) 25 % solution 100 mL 100 mL Intravenous PRN   Midodrine HCl (PROAMATINE) tab 20 mg 20 mg Oral TID   Fludrocortisone Acetate (FLORINEF) tab 0.1 mg 0.1 mg Oral Daily   sodium bicarbonate tab 650 mg 650 mg Oral TID   digoxin Brock Astorga

## 2018-09-01 NOTE — PROGRESS NOTES
[facility]  Progress Note    Jocelyn Fallon Patient Status:  Inpatient    1945 MRN [id number]   [facility] 5NW-A Attending Jocelyn Reyes MD   [facility] Day # 12 PCP Jey Mancia DO     Subjective:  Alm aRosa Cruz is a(n) 75 to improve      Medications reviewed     Assessment and Plan:   Patient Active Problem List:     Unspecified urinary incontinence     Morbid obesity ([facility] [address]     Unspecified venous (peripheral) insufficiency     Osteoarthritis     Gout     Anemia in ESRD (end Florinef and Midodrine  4. Clinically ISABELLE-patient/obesity hypoventilation syndrome failed BiPAP therapy  and is now on AVAPS with improved PaCO2 both on and off support  5. Chronic atrial fibrillation-controlled rate   6.  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

## 2018-09-01 NOTE — PROGRESS NOTES
[facility]  Progress Note     Gertrudemegan Knightland Patient Status:  Inpatient    1945 MRN [id number]   [facility] 4SW-A Attending Shankar Casillas MD   [facility] Day # 12 PCP Juan Manuel Montalvo DO     Chief Complaint: no complains    S: Pa 08/31/18   0456  09/01/18   0630   PTP  23.3*  24.1*  25.5*   INR  1.99*  2.08*  2.24*       No results for input(s): TROP, CK in the last 168 hours. Imaging: Imaging data reviewed in Epic.     Medications:   • epoetin ever  10,000 Units Intravenous Case d/w patient, RN.  D/c planning.       Arnoldo BLACK  8:15 AM    Addendum  Patient seen and examined  Chest: Diminshed B/L  CVS: S1, S2, RRR  ABD: Soft, NT, ND, BS+  EXT: No c/c    Imaging: Reviewed  Agree with above  DC planning  Can DC PICC u

## 2018-09-01 NOTE — DISCHARGE SUMMARY
[facility] [place] HOSPITALIST  DISCHARGE SUMMARY     [facility] Patient Status:  Inpatient    1945 MRN [id number]   [facility] 5NW-A Attending Chaparro Reese MD   [facility] Day # 12 PCP Bernadine Cai DO     Date of Admission: 2018  Terrance on HD, HTN, Afib on coumadin, lymphedema presented with worsening SOB x2 weeks, weakness and inability to walk as her legs have become more swollen. She has also had increase cough more than usual. She is on home O2 at night.    She missed her HD today as s HD TTHS per nephrology. Social work assisted with discharge planning. She was discharged to subacute rehab once cleared by all consultants. She is instructed to follow-up with primary care, cardiology in 1 week, pulmonology in 2 weeks.       Procedures du instructions  · Another medication with the same name was removed. Continue taking this medication, and follow the directions you see here. Take 1 tablet (4 mg total) by mouth daily.    Stop taking on:  10/1/2018  Quantity:  30 tablet  Refills:  0 Refills:  0        STOP taking these medications    metoprolol Tartrate 25 MG Tabs  Commonly known as:  LOPRESSOR              Where to Get Your Medications      Please  your prescriptions at the location directed by your doctor or nurse    Bring a

## 2018-09-01 NOTE — RESPIRATORY THERAPY NOTE
Pt taken off AVAPS @0715. PT placed back on AVAPS at 1015. PT did not want to go on. Kept wanting more time. Delaying, calling  etc. Mili Orozcoosa her back on at 1015.  AM ABG was also done

## 2018-09-02 NOTE — PLAN OF CARE
Assumed care of patient at 0730, a+ox4 at this time, avaps on at this time and patient tolerating well. Denies sob or chest pain at this time, tele on and h  In the 70's.   Social work called and stated that facility she will be transferred to now has the a

## 2018-09-02 NOTE — PROGRESS NOTES
[facility]  Nephrology Progress Note    Hanh Fallon Attending:  Albania Ponce MD       Assessment and Plan:    1) ESRD- fluid overload much improved; mild residual edema without CHF.  Next HD Tues per usual routine     2) Anemia- due to ESRD; o 25 % solution 100 mL 100 mL Intravenous PRN   Midodrine HCl (PROAMATINE) tab 20 mg 20 mg Oral TID   Fludrocortisone Acetate (FLORINEF) tab 0.1 mg 0.1 mg Oral Daily   sodium bicarbonate tab 650 mg 650 mg Oral TID   digoxin (LANOXIN) tab 125 mcg 125 mcg Oral

## 2018-09-02 NOTE — PROGRESS NOTES
[facility]  Progress Note     Ethyl [facility] Patient Status:  Inpatient    1945 MRN [id number]   [facility] 4SW-A Attending Kadie Burrows MD   [address] Day # 16 PCP Rissa Ceron DO     Chief Complaint: no complains    S: Pa Imaging: Imaging data reviewed in Epic.     Medications:   • docusate sodium  100 mg Oral BID   • Midodrine HCl  20 mg Oral TID   • Fludrocortisone Acetate  0.1 mg Oral Daily   • sodium bicarbonate  650 mg Oral TID   • digoxin  125 mcg Oral Once per day

## 2018-09-02 NOTE — CM/SW NOTE
Call from RN re: d/c today to [facility] if AVAPS machine in place. Pt will require ambulance transport. MARILUZ contacted [facility] and spoke with manager on duty- Munir. He confirms that facility has the AVAPS for pt in place.   Discussed 1PM d/c brandon

## 2018-09-02 NOTE — PROGRESS NOTES
[facility]  Progress Note    Thanh Fallon Patient Status:  Inpatient    1945 MRN [id number]   [facility] 5NW-A Attending Symone Hammond MD   [facility] Day # 16 PCP Berenice Pallas, DO     Subjective:  Mak Fuentes is a(n) 43 6.29*  4.88*   GFRAA  7*  10*   GFRNAA  6*  8*   CA  8.9  8.4   NA  131*  132*   K  4.7  4.6   CL  96*  98*   CO2  27.0  28.0     Recent Labs   Lab  09/01/18   0730   ABGPHT  7.29*   [id number]  60*   [id number]  78*   ABGHCO3  28.0*   ABGBE  0.6   TEMP  98.4

## 2018-09-06 NOTE — PROGRESS NOTES
Elizabeth Childers  : 1945  Age 68year old  female patient is admitted to Facility: [facility] for rehabilitation and medical management.     [address] [place] [address] Drive date:  18  Discharge date to [facility]:  18  ELOS:  11-14 days  Anticipated discharge She will continue to need mask at night and with naps after discharge per pulmonology recs. She was discharged in stable condition to [facility] for rehabilitation and medical management.     Yesterday it was reported by [facility] NP to this provider that the mira 50. 0-59.9, adult ([facility] 1/16/2017   • Muscle weakness    • NSVT (nonsustained ventricular tachycardia) ([facility] 10/5/2013   • ISABELLE (obstructive sleep apnea)    • Osteoarthritis    • Peripheral vascular disease ([facility]    • Permanent atrial fibrillation ([facility] 9/20 MG/3ML) 0.083% Inhalation Nebu Soln Take 3 mL (2.5 mg total) by nebulization every 6 (six) hours as needed for Wheezing or Shortness of Breath. Disp: 360 mL Rfl: 0   Fludrocortisone Acetate 0.1 MG Oral Tab Take 1 tablet (0.1 mg total) by mouth daily.  Disp: Rfl:         VITALS:  /69   Pulse 70   Temp 97 °F (36.1 °C)   Resp 20   SpO2 94%      REVIEW OF SYSTEMS:  GENERAL HEALTH:feels well otherwise  SKIN: ---miguel le scabs  WOUNDS:  none  EYES:no visual complaints or deficits  HENT: denies nasal congestion, forgetful    DIAGNOSTICS REVIEWED AT THIS VISIT:    CXR from [address] [place] [address]  dated 9/5/18  FINDINGS:  CHEST:  The heart is slightly enlarged with atheromatous changes of the aorta.   There is diffuse haziness of the right mid and lower fie Thurs., and Saturday  2. Nephrology consult     Gout  1. Allopurinol 100 mg qd    Hypothyroidism  1. Levothyroxine 125 mcq qd    AOCD from CKD-stable/Thrombocytopenia-chronic-stable  1. Monitor Labs  2. Venofer IV push q weekly     H/O C. Diff.   1. Monitor

## 2018-09-27 ENCOUNTER — TELEPHONE (OUTPATIENT)
Dept: FAMILY MEDICINE CLINIC | Facility: CLINIC | Age: 73
End: 2018-09-27

## 2019-02-28 VITALS — HEART RATE: 68 BPM | WEIGHT: 264.56 LBS

## 2019-03-01 VITALS
WEIGHT: 266 LBS | HEART RATE: 60 BPM | HEIGHT: 62 IN | BODY MASS INDEX: 48.95 KG/M2 | DIASTOLIC BLOOD PRESSURE: 72 MMHG | SYSTOLIC BLOOD PRESSURE: 110 MMHG

## 2019-12-31 ENCOUNTER — PRIOR ORIGINAL RECORDS (OUTPATIENT)
Dept: OTHER | Age: 74
End: 2019-12-31

## 2020-10-11 VITALS — HEIGHT: 62 IN | DIASTOLIC BLOOD PRESSURE: 70 MMHG | SYSTOLIC BLOOD PRESSURE: 112 MMHG | BODY MASS INDEX: 52.49 KG/M2

## 2020-10-11 VITALS — SYSTOLIC BLOOD PRESSURE: 112 MMHG | DIASTOLIC BLOOD PRESSURE: 60 MMHG

## 2020-10-11 VITALS — DIASTOLIC BLOOD PRESSURE: 56 MMHG | SYSTOLIC BLOOD PRESSURE: 108 MMHG

## 2022-10-20 NOTE — CM/SW NOTE
09/04/18 0900   Discharge disposition   Expected discharge disposition Skilled Nurs   Name of [facility]   Patient is Discharged to a [address] [place] [address] Yes   Discharge [facility]     Patient disch Brow Lift Text: A midfrontal incision was made medially to the defect to allow access to the tissues just superior to the left eyebrow. Following careful dissection inferiorly in a supraperiosteal plane to the level of the left eyebrow, several 3-0 monocryl sutures were used to resuspend the eyebrow orbicularis oculi muscular unit to the superior frontal bone periosteum. This resulted in an appropriate reapproximation of static eyebrow symmetry and correction of the left brow ptosis.

## 2022-11-10 NOTE — PROGRESS NOTES
[facility]  Progress Note     Maynor Fallon Patient Status:  Inpatient    1945 MRN [id number]   [facility] 3NE-A Attending Linus Leiva MD   [facility] Day # 8 PCP Jordon Blanco DO     Chief Complaint: tired    S: tolerated CO2  27.0  30.0   < >  29.0  28.0  30.0   ALKPHO  89  77   --    --    --    --    AST  12*  14*   --    --    --    --    ALT  8*  <6*   --    --    --    --    BILT  0.9  0.9   --    --    --    --    TP  7.4  7.2   --    --    --    --     < > = value 56  15. H/o c. difficile colitis in 2015  15. Hypomagnesia, replace per renal  16. Chronic lymphedema with chronic left leg wound   1.  Off abx per ID, Wound care      Quality:  · DVT Prophylaxis: coumadin daily INR - INR still elevated - cont hold  · CODE No

## 2023-06-15 NOTE — PATIENT INSTRUCTIONS
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  Plan: see note #1 Leg Swelling in Both Legs    Swelling of the feet, ankles, and legs is called edema. It is caused by excess fluid that has collected in the tissues. Extra fluid in the body settles in the lowest part because of gravity.  This is why the legs and feet are · If your healthcare provider says that your leg swelling is caused by venous insufficiency or varicose veins, don't sit or  one place for long periods of time. Take breaks and walk about every few hours. Brisk walking is a good exercise.  It helps

## 2024-07-29 NOTE — ED INITIAL ASSESSMENT (HPI)
Pt reports substernal chest pain for a few days. Pt has also been SOB. Pt here now due to she will have a fistula placed next week.
Moderate Variability

## (undated) DEVICE — GOWN SURG AERO CHROME XXL

## (undated) DEVICE — TRANSPOSAL ULTRAFLEX DUO/QUAD ULTRA CART MANIFOLD

## (undated) DEVICE — STERILE POLYISOPRENE POWDER-FREE SURGICAL GLOVES: Brand: PROTEXIS

## (undated) DEVICE — DECANTER BAG 9": Brand: MEDLINE INDUSTRIES, INC.

## (undated) DEVICE — SUTURE VICRYL 3-0 SH

## (undated) DEVICE — SYRINGE 20CC LL TIP

## (undated) DEVICE — SOL  .9 500ML

## (undated) DEVICE — DRAPE,EXTREMITY,89X128,STERILE: Brand: MEDLINE

## (undated) DEVICE — ARISTA AH ABSORBABLE HEMOSTATIC PARTICLES: Brand: ARISTA™ AH

## (undated) DEVICE — INTENDED TO BE USED TO OCCLUDE, RETRACT AND IDENTIFY ARTERIES, VEINS, TENDONS AND NERVES IN SURGICAL PROCEDURES: Brand: STERION®  VESSEL LOOP

## (undated) DEVICE — INDICATED FOR SURGICAL CLAMPING DURING CARDIOVASCULAR PERIPHERAL VASCULAR, AND GENERAL SURGERY.: Brand: SOFT/FIBRA® SPRING CLIP

## (undated) DEVICE — SUTURE SILK 3-0

## (undated) DEVICE — BASIC DOUBLE BASIN 1-LF: Brand: MEDLINE INDUSTRIES, INC.

## (undated) DEVICE — CV PACK-LF: Brand: MEDLINE INDUSTRIES, INC.

## (undated) DEVICE — DERMABOND LIQUID ADHESIVE

## (undated) DEVICE — VIOLET BRAIDED (POLYGLACTIN 910), SYNTHETIC ABSORBABLE SUTURE: Brand: COATED VICRYL

## (undated) DEVICE — PROXIMATE RH ROTATING HEAD SKIN STAPLERS (35 WIDE) CONTAINS 35 STAINLESS STEEL STAPLES: Brand: PROXIMATE

## (undated) DEVICE — HEMOCLIP HORIZON SM MULTI

## (undated) DEVICE — CHLORAPREP 26ML APPLICATOR

## (undated) DEVICE — SOL  .9 1000ML BTL

## (undated) DEVICE — SUTURE MONOCRYL 4-0 PS-2

## (undated) DEVICE — SUTURE PROLENE 6-0 BV-1

## (undated) DEVICE — KENDALL SCD EXPRESS SLEEVES, KNEE LENGTH, MEDIUM: Brand: KENDALL SCD

## (undated) DEVICE — GLOVE BIOGEL M SURG SZ 71/2

## (undated) DEVICE — ABSORBABLE HEMOSTAT (OXIDIZED REGENERATED CELLULOSE, U.S.P.): Brand: SURGICEL

## (undated) DEVICE — GEL AQUASONIC 100 20GR

## (undated) NOTE — Clinical Note
Date: 6/15/2017    Patient Name: Erin Nogueira    To Whom it may concern:     This is a request/order/prescription for:  Heavy Duty Wheelchair-Wide Width    Diagnosis:   Unsteady Gait R26.81  Weakness of Both Lower Extremities R29.898  Chronic Lower Ext

## (undated) NOTE — IP AVS SNAPSHOT
Patient Demographics     Address  [address] [place] [address] Phone  [phone] (Home) *Preferred*  [phone] [facility])      Emergency Contact(s)     Name Relation Home Work Mobile    [address] Spouse [phone] 1. An appointment has been made for you to see your doctor or healthcare provider within 7 days of hospital discharge. It is important that you attend this appointment to make sure your symptoms are under control.      2. Your recommended sodium intake is 1 Wound to left leg- Aquacel and wrap with kerlix and change daily and prn     Follow-up Information     Minna Kramer DO. Schedule an appointment as soon as possible for a visit in 1 week.     Specialties:  Family Medicine, IP Consult to Primary Care  Co calcium acetate 667 MG Caps  Commonly known as:  PHOSLO  Next dose due:  09/02/18 6m      Take 2 capsules by mouth See Admin Instructions.  Take 2 capsules three times a day with meals and take 1 capsule bid with snacks          digoxin 0.125 MG Tabs  Commo VENOFER 20 MG/ML Soln  Generic drug:  iron sucrose      50mg weekly   Juan Manuel Montalvo,          Warfarin Sodium 4 MG Tabs  Commonly known as:  COUMADIN  Next dose due:  09/02/18 9pm      Take 1 tablet (4 mg total) by mouth daily.   Stop taking on:  10/1/2 [id number] allopurinol (ZYLOPRIM) tab 100 mg 09/02/18 0814 Given      [id number] docusate sodium (COLACE) cap 100 mg 09/02/18 0814 Given      [id number] epoetin ever (EPOGEN,PROCRIT) injection 10,000 Units 09/01/18 1849 Given      [id number] gabapentin (NEURO Order Status:  Completed Lab Status:  Final result Updated:  08/29/18 1141    Specimen:  Other from Nares      Mrsa Culture No MRSA Isolated    Blood Culture FREQ X 2 [[id number]] Collected:  08/16/18 1652    Order Status:  Completed Lab Status:  Final res her legs have become more swollen. She has also had increase cough more than usual. She is on home O2 at night. She missed her HD today as she could not get into the car to go the dialysis center.  She is seen at the lymphedema clinic , has a chronic left • Permanent atrial fibrillation ([facility] 9/20/2017   • Pneumonia of right lower lobe due to infectious organism ([facility] 4/23/2017    vs b/l LL vs not pneumonia ie vs other    • Pressure ulcer, heel(707.07)     left heel   • PUD (peptic ulcer disease) 3/17/2017 omeprazole 20 MG Oral Capsule Delayed Release Take 1 capsule (20 mg total) by mouth daily. Disp: 90 capsule Rfl: 0   MAGNESIUM-OXIDE 400 (241.3 Mg) MG Oral Tab 1 tablet nightly.  Disp:  Rfl: 3   Midodrine HCl 10 MG Oral Tab Take 1 tablet (10 mg total) by mo Neurologic: No focal neurological deficits. CNII-XII grossly intact. Musculoskeletal: Moves all extremities.   Extremities:[NB.1] ++ edema in both legs, venous stasis changes , + left lateral leg wound- without drainage or foul smell.[NB.2]   Psychiatric: Patient will require inpatient services that will reasonably be expected to span two midnight's based on the clinical documentation in H+P.    Based on patients current state of illness, I anticipate that, after discharge, patient will require TBD.[NB.1] gabapentin (NEURONTIN) cap 300 mg 300 mg Oral Nightly   Levothyroxine Sodium (SYNTHROID, LEVOTHROID) tab 125 mcg 125 mcg Oral Before breakfast   Pantoprazole Sodium (PROTONIX) EC tab 20 mg 20 mg Oral QAM AC   sevelamer (RENVELA) tab 800 mg 1,600 mg Oral TI 3. Hypotension - chronically low ; continue mididrone; IV pressors as needed   - f/u cultures; NGTD  - schedule midodrine 10 q8  4. Lymphedema/Morbid obesity  5. Anemia due to CKD - continue ZOLTAN w/ HD  6.  Afib - higher rates this am; per cardiology-  on [facility] Morbid obesity with BMI of 50.0-59.9, adult (HCC)    Acute hypercapnic respiratory failure (HCC)    Acute encephalopathy    Dyspnea    Acute on chronic diastolic (congestive) heart failure (HCC)    Shock (HCC)    Thrombocytopenia (HCC)    Hypomagnesemia • Pneumonia of right lower lobe due to infectious organism ([facility] 4/23/2017    vs b/l LL vs not pneumonia ie vs other    • Pressure ulcer, heel(707.07)     left heel   • PUD (peptic ulcer disease) 3/17/2017    EGD with Dr. Castro Rising October 1, 2013    • Pulmon -   Sitting down on and standing up from a chair with arms (e.g., wheelchair, bedside commode, etc.): Unable   -   Moving from lying on back to sitting on the side of the bed?: A Lot   How much help from another person does the patient currently need. .. Goal #3 Patient is able to move legs on the bed in SLR and hip abd/add.       Goal #4 Pt is able to hold LAQ on each leg for 15 seconds.     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     Problem List  Principal Problem:    Acute on chronic congestive heart failure, unspecified heart failure type [facility])  Active Problems elsewhere and of unspecified site 6/29/2012   • Mixed hyperlipidemia 6/25/2016   • Morbid obesity with BMI of 50.0-59.9, adult ([facility] 1/16/2017   • Muscle weakness    • NSVT (nonsustained ventricular tachycardia) ([facility] 10/5/2013   • ISABELLE (obstructive sleep a with her, but does not assist w/ her care. Patient sleeps in an electric lift recliner chair.   Patient is able to stand and take a few steps to the w/c w/ use of walker and 's assist.  Patient's  pushes w/c to the bathroom and patient gets u SENSATION  Light touch:  intact    Communication: Pt is able to communicate all basic needs and wants    Behavioral/Emotional/Social: Pt was participated in and was motivated for therapy today.     RANGE OF MOTION AND STRENGTH ASSESSMENT  Upper extremity RO within reach;RN aware of session/findings; All patient questions and concerns addressed; Family present    ASSESSMENT     Patient is a 68year old female admitted on 8/16/2018 for[KP. 1] CHF[KP.2].  Complete medical history and occupational profile noted above Patient will perform toileting: with max assist     Functional Transfer Goals  Patient will transfer from sit to stand:  with max assist  Patient will transfer to bedside commode:  with max assist     UE Exercise Program Goal  Patient will be independent w ESRD - HD 3x/wk, Gout, HTN, chronic BLE lymphedema; LLE wound; wound left forearm from biopsy for small squamous cancer.     Problem List  Principal Problem:    Acute on chronic congestive heart failure, unspecified heart failure type [facility])  Active Problems elsewhere and of unspecified site 6/29/2012   • Mixed hyperlipidemia 6/25/2016   • Morbid obesity with BMI of 50.0-59.9, adult ([facility] 1/16/2017   • Muscle weakness    • NSVT (nonsustained ventricular tachycardia) ([facility] 10/5/2013   • ISABELLE (obstructive sleep a concerns addressed; Family present    ASSESSMENT     Patient is a 68year old female admitted on 8/16/2018 for[KP. 1] CHF[KP.2]. Complete medical history and occupational profile noted above. Functional outcome measures completed include[KP. 1] AMPAC, MMT, RO Patient will transfer from sit to stand:  with max assist  Patient will transfer to bedside commode:  with max assist     UE Exercise Program Goal  Patient will be independent with bilateral AROM HEP (home exercise program). [KP.2]     Attribution Key    KP

## (undated) NOTE — LETTER
[facility]  [address] [facility], [address] [place] [address]    Consent for Operation    Date: __________________    Time: _______________    1.  I authorize the performance upon Balbina Terry the following operation:      left arm arteriovenous fistula procedure has been videotaped, the surgeon will obtain the original videotape. The hospital will not be responsible for storage or maintenance of this tape.     6. For the purpose of advancing medical education, I consent to the admittance of observers to t STATEMENTS REQUIRING INSERTION OR COMPLETION WERE FILLED IN.     Signature of Patient:   ___________________________    When the patient is a minor or mentally incompetent to give consent:  Signature of person authorized to consent for patient: ____________ supplements, and pills I can buy without a prescription (including street drugs/illegal medications). Failure to inform my anesthesiologist about these medicines may increase my risk of anesthetic complications.   · If I am allergic to anything or have had Anesthesiologist Signature     Date   Time  I have discussed the procedure and information above with the patient (or patient’s representative) and answered their questions. The patient or their representative has agreed to have anesthesia services.     ___

## (undated) NOTE — MR AVS SNAPSHOT
[facility] [place]  [address] [facility]  [place]  [address]  [phone]               Thank you for choosing us for your health care visit with Tho Wilkerson MD.  We are glad to serve you and happy to provide you with this tidwell ? Refills are not addressed on weekends; covering physicians do not authorize routine medications on weekends. ? No narcotics or controlled substances are refilled after noon on Fridays or by on call physicians.   ? By law, narcotics cannot be faxed or olena approved and is a COVERED benefit. Although the [facility] staff does its due diligence, the insurance carrier gives the disclaimer that \"Although the procedure is authorized, this does not guarantee payment. \"    Ultimately the patient is responsible for payme Commonly known as:  PRILOSEC           RENVELA 800 MG Tabs   Generic drug:  Sevelamer Carbonate   TK 2 TS PO TID WITH MEALS AND 1 T PO WITH SNACKS           Saline Nasal Spray 0.65 % Soln   2 sprays by Nasal route nightly.    Commonly known as:  SALINE MIST

## (undated) NOTE — ED AVS SNAPSHOT
[facility] Emergency Department    [facility] [address] [place] [id number]    Phone:  [phone]    Fax:  [phone]           Ms. Elena Sprague   MRN: [id number]    Department:  [facility] Emergency Department   Date of Visit: Where to Get Your Medications      You can get these medications from any pharmacy     Bring a paper prescription for each of these medications    - azithromycin 250 MG Tabs            Discharge References/Attachments     FIBRILLATION, ATRIAL (CARLOS [facility] Emergency Department. Follow-up care is at the discretion of that Physician. IF THERE IS ANY CHANGE OR WORSENING OF YOUR CONDITION, CALL YOUR PRIMARY CARE PHYSICIAN AT ONCE OR RETURN IMMEDIATELY TO THE EMERGENCY DEPARTMENT.     If you hav [phone]. - If you don’t have insurance, Edward Fatima has partnered with Patient [address] to help you get signed up for insurance coverage.   Patient James Osorio is a Federal Navigator program that can help with your Affordab

## (undated) NOTE — LETTER
[facility]  [address] [facility], [address] [place] [address]    Consent for Operation    Date: __________________    Time: _______________    1.  I authorize the performance upon Elizabeth Childers the following operation:      left arm arteriovenous fistula procedure has been videotaped, the surgeon will obtain the original videotape. The hospital will not be responsible for storage or maintenance of this tape.     6. For the purpose of advancing medical education, I consent to the admittance of observers to t STATEMENTS REQUIRING INSERTION OR COMPLETION WERE FILLED IN.     Signature of Patient:   ___________________________    When the patient is a minor or mentally incompetent to give consent:  Signature of person authorized to consent for patient: ____________ supplements, and pills I can buy without a prescription (including street drugs/illegal medications). Failure to inform my anesthesiologist about these medicines may increase my risk of anesthetic complications.   · If I am allergic to anything or have had Anesthesiologist Signature     Date   Time  I have discussed the procedure and information above with the patient (or patient’s representative) and answered their questions. The patient or their representative has agreed to have anesthesia services.     ___

## (undated) NOTE — LETTER
Date & Time: 4/14/2017, 12:59 AM  Patient: Louise Helm  Attending Provider: Adi Santana MD      This certifies that Fany Melendez, a patient at an [facility] facility, am leaving the facility voluntarily and against t

## (undated) NOTE — MR AVS SNAPSHOT
[facility]  [phone]               Thank you for choosing us for your health care visit with Don Zuniga DO.   We are glad to serve you and happy to provide you with this s This list is accurate as of: 4/19/17 11:18 AM.  Always use your most recent med list.                acetaminophen 325 MG Tabs   Take 2 tablets by mouth every 6 (six) hours as needed.    Commonly known as:  TYLENOL           allopurinol 100 MG Tabs   TAKE 1 Lifestyle Modification Recommendations:    Modification Recommendation   Weight Reduction Maintain normal body weight (body mass index 18.5-24.9 kg/m2)   DASH eating plan Adopt a diet rich in fruits, vegetables, and low fat dairy products with reduced cont

## (undated) NOTE — LETTER
[address] [facility] [address] [facility]     PICC LINE INSERTION CONSENT     I agree to have a Peripherally Inserted Central Catheter (PICC) placed in my arm.    1. The PICC insertion procedure, care, maintenance, risks, benefits, and c goals; and potential problems that might occur during recuperation.  They also discussed reasonable alternatives to the PICC, including risks, benefits, and side effects related to the alternatives and risks related to not receiving this procedure      ____

## (undated) NOTE — ED AVS SNAPSHOT
[facility] Emergency Department    [place] [address]    Phone:  [phone]    Fax:  [phone]           Ms. Pete Barnhart   MRN: [id number]    Department:  [facility] Emergency Department   Date of Visit: IF THERE IS ANY CHANGE OR WORSENING OF YOUR CONDITION, CALL YOUR PRIMARY CARE PHYSICIAN AT ONCE OR RETURN IMMEDIATELY TO THE EMERGENCY DEPARTMENT.     If you have been prescribed any medication(s), please fill your prescription right away and begin taking t

## (undated) NOTE — MR AVS SNAPSHOT
[facility]  [phone]               Thank you for choosing us for your health care visit with Jordon Blanco DO.   We are glad to serve you and happy to provide you with this s Mixed hyperlipidemia    Paresthesia of both feet        Intention tremor          Instructions and Information about [address] is a burning or prickling sensation that is sometimes felt in the hands, arms, legs or feet. further testing or evaluation.   When to seek medical advice  Call your healthcare provider right away if any of the following occur:  · Numbness or weakness of the face, one arm, or one leg  · Slurred speech, confusion, trouble speaking, walking, or seeing MAGNESIUM-OXIDE 400 (241.3 Mg) MG Tabs   Generic drug:  Magnesium Oxide   TK 1 T PO QD           Midodrine HCl 10 MG Tabs   Take 1 tablet by mouth as needed with dialysis (sbp<90).    What changed:    - when to take this  - additional instructions   Common

## (undated) NOTE — IP AVS SNAPSHOT
[address]            (For Outpatient Use Only) Initial Admit Date: 8/16/2018   Inpt/Obs Admit Date: Inpt: 8/16/18 / Obs: N/A   Discharge Date:    Kathya Roll:  [de-identified]   MRN: [de-identified]   CSN: [id number]        ENCOUNTER  Patient Subscriber Name:  Vinnie Bryant :    Subscriber ID:  Pt Rel to Subscriber:    Hospital Account Financial Class: Medicare    2018